# Patient Record
Sex: FEMALE | URBAN - METROPOLITAN AREA
[De-identification: names, ages, dates, MRNs, and addresses within clinical notes are randomized per-mention and may not be internally consistent; named-entity substitution may affect disease eponyms.]

---

## 2017-04-25 ENCOUNTER — INPATIENT (INPATIENT)
Facility: HOSPITAL | Age: 27
LOS: 2 days | Discharge: HOME | End: 2017-04-28
Attending: OBSTETRICS & GYNECOLOGY | Admitting: OBSTETRICS & GYNECOLOGY

## 2017-04-25 VITALS — WEIGHT: 264.55 LBS | HEIGHT: 70 IN

## 2017-04-25 DIAGNOSIS — Z3A.00 WEEKS OF GESTATION OF PREGNANCY NOT SPECIFIED: ICD-10-CM

## 2017-04-25 DIAGNOSIS — O26.899 OTHER SPECIFIED PREGNANCY RELATED CONDITIONS, UNSPECIFIED TRIMESTER: ICD-10-CM

## 2017-04-25 LAB
BLD GP AB SCN SERPL QL: NEGATIVE — SIGNIFICANT CHANGE UP
HCT VFR BLD CALC: 35.9 % — SIGNIFICANT CHANGE UP (ref 34.5–45)
HGB BLD-MCNC: 11.6 G/DL — SIGNIFICANT CHANGE UP (ref 11.5–15.5)
MCHC RBC-ENTMCNC: 29.1 PG — SIGNIFICANT CHANGE UP (ref 27–34)
MCHC RBC-ENTMCNC: 32.3 % — SIGNIFICANT CHANGE UP (ref 32–36)
MCV RBC AUTO: 90 FL — SIGNIFICANT CHANGE UP (ref 80–100)
PLATELET # BLD AUTO: 206 K/UL — SIGNIFICANT CHANGE UP (ref 150–400)
PMV BLD: 10.5 FL — SIGNIFICANT CHANGE UP (ref 7–13)
RBC # BLD: 3.99 M/UL — SIGNIFICANT CHANGE UP (ref 3.8–5.2)
RBC # FLD: 13.8 % — SIGNIFICANT CHANGE UP (ref 10.3–14.5)
RH IG SCN BLD-IMP: POSITIVE — SIGNIFICANT CHANGE UP
RH IG SCN BLD-IMP: POSITIVE — SIGNIFICANT CHANGE UP
WBC # BLD: 13.07 K/UL — HIGH (ref 3.8–10.5)
WBC # FLD AUTO: 13.07 K/UL — HIGH (ref 3.8–10.5)

## 2017-04-25 RX ORDER — SODIUM CHLORIDE 9 MG/ML
1000 INJECTION, SOLUTION INTRAVENOUS ONCE
Refills: 0 | Status: COMPLETED | OUTPATIENT
Start: 2017-04-25 | End: 2017-04-26

## 2017-04-25 RX ORDER — CITRIC ACID/SODIUM CITRATE 300-500 MG
30 SOLUTION, ORAL ORAL ONCE
Refills: 0 | Status: DISCONTINUED | OUTPATIENT
Start: 2017-04-25 | End: 2017-04-27

## 2017-04-25 RX ORDER — CITRIC ACID/SODIUM CITRATE 300-500 MG
15 SOLUTION, ORAL ORAL EVERY 4 HOURS
Refills: 0 | Status: DISCONTINUED | OUTPATIENT
Start: 2017-04-25 | End: 2017-04-26

## 2017-04-25 RX ORDER — INFLUENZA VIRUS VACCINE 15; 15; 15; 15 UG/.5ML; UG/.5ML; UG/.5ML; UG/.5ML
0.5 SUSPENSION INTRAMUSCULAR ONCE
Refills: 0 | Status: DISCONTINUED | OUTPATIENT
Start: 2017-04-25 | End: 2017-04-28

## 2017-04-25 RX ORDER — OXYTOCIN 10 UNIT/ML
333.33 VIAL (ML) INJECTION
Qty: 20 | Refills: 0 | Status: DISCONTINUED | OUTPATIENT
Start: 2017-04-25 | End: 2017-04-26

## 2017-04-25 RX ORDER — SODIUM CHLORIDE 9 MG/ML
1000 INJECTION, SOLUTION INTRAVENOUS
Refills: 0 | Status: DISCONTINUED | OUTPATIENT
Start: 2017-04-25 | End: 2017-04-27

## 2017-04-25 RX ORDER — SODIUM CHLORIDE 9 MG/ML
1000 INJECTION, SOLUTION INTRAVENOUS
Refills: 0 | Status: DISCONTINUED | OUTPATIENT
Start: 2017-04-25 | End: 2017-04-26

## 2017-04-25 RX ORDER — METOCLOPRAMIDE HCL 10 MG
10 TABLET ORAL ONCE
Refills: 0 | Status: DISCONTINUED | OUTPATIENT
Start: 2017-04-25 | End: 2017-04-27

## 2017-04-25 RX ORDER — FAMOTIDINE 10 MG/ML
20 INJECTION INTRAVENOUS ONCE
Refills: 0 | Status: DISCONTINUED | OUTPATIENT
Start: 2017-04-25 | End: 2017-04-27

## 2017-04-25 RX ORDER — SODIUM CHLORIDE 9 MG/ML
500 INJECTION, SOLUTION INTRAVENOUS ONCE
Refills: 0 | Status: COMPLETED | OUTPATIENT
Start: 2017-04-25 | End: 2017-04-25

## 2017-04-25 RX ADMIN — SODIUM CHLORIDE 1000 MILLILITER(S): 9 INJECTION, SOLUTION INTRAVENOUS at 19:41

## 2017-04-25 RX ADMIN — SODIUM CHLORIDE 125 MILLILITER(S): 9 INJECTION, SOLUTION INTRAVENOUS at 23:12

## 2017-04-26 ENCOUNTER — TRANSCRIPTION ENCOUNTER (OUTPATIENT)
Age: 27
End: 2017-04-26

## 2017-04-26 LAB — T PALLIDUM AB TITR SER: NEGATIVE — SIGNIFICANT CHANGE UP

## 2017-04-26 RX ORDER — OXYTOCIN 10 UNIT/ML
333.33 VIAL (ML) INJECTION
Qty: 20 | Refills: 0 | Status: COMPLETED | OUTPATIENT
Start: 2017-04-26

## 2017-04-26 RX ORDER — OXYTOCIN 10 UNIT/ML
41.67 VIAL (ML) INJECTION
Qty: 20 | Refills: 0 | Status: DISCONTINUED | OUTPATIENT
Start: 2017-04-26 | End: 2017-04-27

## 2017-04-26 RX ORDER — OXYCODONE HYDROCHLORIDE 5 MG/1
5 TABLET ORAL
Refills: 0 | Status: DISCONTINUED | OUTPATIENT
Start: 2017-04-26 | End: 2017-04-28

## 2017-04-26 RX ORDER — TETANUS TOXOID, REDUCED DIPHTHERIA TOXOID AND ACELLULAR PERTUSSIS VACCINE, ADSORBED 5; 2.5; 8; 8; 2.5 [IU]/.5ML; [IU]/.5ML; UG/.5ML; UG/.5ML; UG/.5ML
0.5 SUSPENSION INTRAMUSCULAR ONCE
Refills: 0 | Status: DISCONTINUED | OUTPATIENT
Start: 2017-04-27 | End: 2017-04-28

## 2017-04-26 RX ORDER — OXYTOCIN 10 UNIT/ML
333.33 VIAL (ML) INJECTION
Qty: 20 | Refills: 0 | Status: COMPLETED | OUTPATIENT
Start: 2017-04-26 | End: 2017-04-26

## 2017-04-26 RX ORDER — LANOLIN
1 OINTMENT (GRAM) TOPICAL EVERY 6 HOURS
Refills: 0 | Status: DISCONTINUED | OUTPATIENT
Start: 2017-04-27 | End: 2017-04-28

## 2017-04-26 RX ORDER — IBUPROFEN 200 MG
600 TABLET ORAL EVERY 6 HOURS
Refills: 0 | Status: DISCONTINUED | OUTPATIENT
Start: 2017-04-26 | End: 2017-04-28

## 2017-04-26 RX ORDER — ACETAMINOPHEN 500 MG
975 TABLET ORAL EVERY 6 HOURS
Refills: 0 | Status: DISCONTINUED | OUTPATIENT
Start: 2017-04-26 | End: 2017-04-28

## 2017-04-26 RX ORDER — KETOROLAC TROMETHAMINE 30 MG/ML
30 SYRINGE (ML) INJECTION ONCE
Refills: 0 | Status: DISCONTINUED | OUTPATIENT
Start: 2017-04-26 | End: 2017-04-26

## 2017-04-26 RX ORDER — HYDROCORTISONE 1 %
1 OINTMENT (GRAM) TOPICAL EVERY 4 HOURS
Refills: 0 | Status: DISCONTINUED | OUTPATIENT
Start: 2017-04-26 | End: 2017-04-26

## 2017-04-26 RX ORDER — BUTORPHANOL TARTRATE 2 MG/ML
2 INJECTION, SOLUTION INTRAMUSCULAR; INTRAVENOUS ONCE
Refills: 0 | Status: DISCONTINUED | OUTPATIENT
Start: 2017-04-26 | End: 2017-04-26

## 2017-04-26 RX ORDER — DIBUCAINE 1 %
1 OINTMENT (GRAM) RECTAL EVERY 4 HOURS
Refills: 0 | Status: DISCONTINUED | OUTPATIENT
Start: 2017-04-26 | End: 2017-04-26

## 2017-04-26 RX ORDER — DOCUSATE SODIUM 100 MG
100 CAPSULE ORAL
Refills: 0 | Status: DISCONTINUED | OUTPATIENT
Start: 2017-04-27 | End: 2017-04-28

## 2017-04-26 RX ORDER — OXYTOCIN 10 UNIT/ML
2 VIAL (ML) INJECTION
Qty: 30 | Refills: 0 | Status: DISCONTINUED | OUTPATIENT
Start: 2017-04-26 | End: 2017-04-27

## 2017-04-26 RX ORDER — SODIUM CHLORIDE 9 MG/ML
3 INJECTION INTRAMUSCULAR; INTRAVENOUS; SUBCUTANEOUS EVERY 8 HOURS
Refills: 0 | Status: DISCONTINUED | OUTPATIENT
Start: 2017-04-27 | End: 2017-04-27

## 2017-04-26 RX ORDER — SODIUM CHLORIDE 9 MG/ML
3 INJECTION INTRAMUSCULAR; INTRAVENOUS; SUBCUTANEOUS EVERY 8 HOURS
Refills: 0 | Status: DISCONTINUED | OUTPATIENT
Start: 2017-04-26 | End: 2017-04-26

## 2017-04-26 RX ORDER — PRAMOXINE HYDROCHLORIDE 150 MG/15G
1 AEROSOL, FOAM RECTAL EVERY 4 HOURS
Refills: 0 | Status: DISCONTINUED | OUTPATIENT
Start: 2017-04-27 | End: 2017-04-27

## 2017-04-26 RX ORDER — SIMETHICONE 80 MG/1
80 TABLET, CHEWABLE ORAL EVERY 6 HOURS
Refills: 0 | Status: DISCONTINUED | OUTPATIENT
Start: 2017-04-27 | End: 2017-04-28

## 2017-04-26 RX ORDER — MAGNESIUM HYDROXIDE 400 MG/1
30 TABLET, CHEWABLE ORAL
Refills: 0 | Status: DISCONTINUED | OUTPATIENT
Start: 2017-04-27 | End: 2017-04-28

## 2017-04-26 RX ORDER — DIPHENHYDRAMINE HCL 50 MG
25 CAPSULE ORAL EVERY 6 HOURS
Refills: 0 | Status: DISCONTINUED | OUTPATIENT
Start: 2017-04-27 | End: 2017-04-28

## 2017-04-26 RX ORDER — AER TRAVELER 0.5 G/1
1 SOLUTION RECTAL; TOPICAL EVERY 4 HOURS
Refills: 0 | Status: DISCONTINUED | OUTPATIENT
Start: 2017-04-27 | End: 2017-04-28

## 2017-04-26 RX ORDER — AER TRAVELER 0.5 G/1
1 SOLUTION RECTAL; TOPICAL EVERY 4 HOURS
Refills: 0 | Status: DISCONTINUED | OUTPATIENT
Start: 2017-04-26 | End: 2017-04-26

## 2017-04-26 RX ORDER — OXYTOCIN 10 UNIT/ML
41.67 VIAL (ML) INJECTION
Qty: 20 | Refills: 0 | Status: DISCONTINUED | OUTPATIENT
Start: 2017-04-27 | End: 2017-04-27

## 2017-04-26 RX ORDER — DIBUCAINE 1 %
1 OINTMENT (GRAM) RECTAL EVERY 4 HOURS
Refills: 0 | Status: DISCONTINUED | OUTPATIENT
Start: 2017-04-27 | End: 2017-04-28

## 2017-04-26 RX ORDER — HYDROCORTISONE 1 %
1 OINTMENT (GRAM) TOPICAL EVERY 4 HOURS
Refills: 0 | Status: DISCONTINUED | OUTPATIENT
Start: 2017-04-27 | End: 2017-04-27

## 2017-04-26 RX ORDER — OXYCODONE HYDROCHLORIDE 5 MG/1
5 TABLET ORAL EVERY 4 HOURS
Refills: 0 | Status: DISCONTINUED | OUTPATIENT
Start: 2017-04-26 | End: 2017-04-28

## 2017-04-26 RX ORDER — PRAMOXINE HYDROCHLORIDE 150 MG/15G
1 AEROSOL, FOAM RECTAL EVERY 4 HOURS
Refills: 0 | Status: DISCONTINUED | OUTPATIENT
Start: 2017-04-26 | End: 2017-04-26

## 2017-04-26 RX ORDER — GLYCERIN ADULT
1 SUPPOSITORY, RECTAL RECTAL AT BEDTIME
Refills: 0 | Status: DISCONTINUED | OUTPATIENT
Start: 2017-04-27 | End: 2017-04-28

## 2017-04-26 RX ADMIN — BUTORPHANOL TARTRATE 2 MILLIGRAM(S): 2 INJECTION, SOLUTION INTRAMUSCULAR; INTRAVENOUS at 05:28

## 2017-04-26 RX ADMIN — BUTORPHANOL TARTRATE 2 MILLIGRAM(S): 2 INJECTION, SOLUTION INTRAMUSCULAR; INTRAVENOUS at 05:48

## 2017-04-26 RX ADMIN — Medication 30 MILLIGRAM(S): at 18:05

## 2017-04-26 RX ADMIN — BUTORPHANOL TARTRATE 2 MILLIGRAM(S): 2 INJECTION, SOLUTION INTRAMUSCULAR; INTRAVENOUS at 11:45

## 2017-04-26 RX ADMIN — BUTORPHANOL TARTRATE 2 MILLIGRAM(S): 2 INJECTION, SOLUTION INTRAMUSCULAR; INTRAVENOUS at 11:10

## 2017-04-26 RX ADMIN — Medication 1000 MILLIUNIT(S)/MIN: at 19:22

## 2017-04-26 RX ADMIN — SODIUM CHLORIDE 2000 MILLILITER(S): 9 INJECTION, SOLUTION INTRAVENOUS at 13:00

## 2017-04-26 RX ADMIN — Medication 204 MILLIGRAM(S): at 11:10

## 2017-04-26 RX ADMIN — Medication 204 MILLIGRAM(S): at 05:28

## 2017-04-26 RX ADMIN — Medication 30 MILLIGRAM(S): at 19:28

## 2017-04-26 RX ADMIN — Medication 2 MILLIUNIT(S)/MIN: at 15:57

## 2017-04-26 RX ADMIN — Medication 125 MILLIUNIT(S)/MIN: at 19:05

## 2017-04-26 NOTE — DISCHARGE NOTE OB - MATERIALS PROVIDED
[facility] Screening Program/  Immunization Record/Breastfeeding Log/Breastfeeding Mother’s Support Group Information/[facility] Hearing Screen Program/Back To Sleep Handout/Shaken Baby Prevention Handout/Breastfeeding Guide and Packet/Birth Certificate Instructions

## 2017-04-26 NOTE — DISCHARGE NOTE OB - PATIENT PORTAL LINK FT
“You can access the FollowHealth Patient Portal, offered by [facility], by registering with the following website: [email or web address]”

## 2017-04-26 NOTE — DISCHARGE NOTE OB - CARE PROVIDER_API CALL
Elliot Moore), Obstetrics and Gynecology  [address] [place] [address]  Phone: [phone]  Fax: [phone]

## 2017-04-26 NOTE — DISCHARGE NOTE OB - CARE PLAN
Principal Discharge DX:	40 weeks gestation of pregnancy  Goal:	Safe delivery of patient  Instructions for follow-up, activity and diet:	Nothing in the vagina, no tampons, douches, baths, swimming or sex for 6-8 weeks.  Regular diet, follow up visit in 6 weeks  Secondary Diagnosis:	Oligohydramnios, third trimester, not applicable or unspecified fetus

## 2017-04-27 RX ORDER — PRAMOXINE HYDROCHLORIDE 150 MG/15G
1 AEROSOL, FOAM RECTAL EVERY 4 HOURS
Refills: 0 | Status: DISCONTINUED | OUTPATIENT
Start: 2017-04-27 | End: 2017-04-28

## 2017-04-27 RX ORDER — HYDROCORTISONE 1 %
1 OINTMENT (GRAM) TOPICAL EVERY 4 HOURS
Refills: 0 | Status: DISCONTINUED | OUTPATIENT
Start: 2017-04-27 | End: 2017-04-28

## 2017-04-27 RX ADMIN — Medication 1 TABLET(S): at 10:27

## 2017-04-27 RX ADMIN — Medication 975 MILLIGRAM(S): at 14:00

## 2017-04-27 RX ADMIN — Medication 600 MILLIGRAM(S): at 11:00

## 2017-04-27 RX ADMIN — Medication 975 MILLIGRAM(S): at 23:00

## 2017-04-27 RX ADMIN — Medication 975 MILLIGRAM(S): at 22:00

## 2017-04-27 RX ADMIN — Medication 600 MILLIGRAM(S): at 18:30

## 2017-04-27 RX ADMIN — Medication 600 MILLIGRAM(S): at 10:27

## 2017-04-27 RX ADMIN — Medication 1 APPLICATION(S): at 22:00

## 2017-04-27 RX ADMIN — Medication 600 MILLIGRAM(S): at 17:46

## 2017-04-27 RX ADMIN — Medication 975 MILLIGRAM(S): at 13:15

## 2017-04-28 VITALS
RESPIRATION RATE: 18 BRPM | SYSTOLIC BLOOD PRESSURE: 128 MMHG | OXYGEN SATURATION: 99 % | HEART RATE: 57 BPM | TEMPERATURE: 99 F | DIASTOLIC BLOOD PRESSURE: 76 MMHG

## 2017-04-28 RX ADMIN — Medication 600 MILLIGRAM(S): at 04:00

## 2017-04-28 RX ADMIN — Medication 600 MILLIGRAM(S): at 12:30

## 2017-04-28 RX ADMIN — Medication 600 MILLIGRAM(S): at 03:00

## 2017-04-28 RX ADMIN — Medication 600 MILLIGRAM(S): at 11:47

## 2017-04-28 RX ADMIN — Medication 1 TABLET(S): at 16:02

## 2017-04-28 RX ADMIN — Medication 975 MILLIGRAM(S): at 07:30

## 2017-04-28 RX ADMIN — Medication 975 MILLIGRAM(S): at 06:30

## 2019-03-12 ENCOUNTER — ASOB RESULT (OUTPATIENT)
Age: 29
End: 2019-03-12

## 2019-03-12 ENCOUNTER — APPOINTMENT (OUTPATIENT)
Dept: ANTEPARTUM | Facility: CLINIC | Age: 29
End: 2019-03-12
Payer: COMMERCIAL

## 2019-03-12 PROCEDURE — 36416 COLLJ CAPILLARY BLOOD SPEC: CPT

## 2019-03-12 PROCEDURE — 76801 OB US < 14 WKS SINGLE FETUS: CPT

## 2019-03-12 PROCEDURE — 76813 OB US NUCHAL MEAS 1 GEST: CPT

## 2019-05-06 ENCOUNTER — ASOB RESULT (OUTPATIENT)
Age: 29
End: 2019-05-06

## 2019-05-06 ENCOUNTER — APPOINTMENT (OUTPATIENT)
Dept: ANTEPARTUM | Facility: CLINIC | Age: 29
End: 2019-05-06
Payer: COMMERCIAL

## 2019-05-06 PROCEDURE — 76811 OB US DETAILED SNGL FETUS: CPT

## 2019-05-06 PROCEDURE — 76817 TRANSVAGINAL US OBSTETRIC: CPT

## 2019-05-13 ENCOUNTER — ASOB RESULT (OUTPATIENT)
Age: 29
End: 2019-05-13

## 2019-05-13 ENCOUNTER — APPOINTMENT (OUTPATIENT)
Dept: ANTEPARTUM | Facility: CLINIC | Age: 29
End: 2019-05-13
Payer: COMMERCIAL

## 2019-05-13 PROCEDURE — 76816 OB US FOLLOW-UP PER FETUS: CPT

## 2019-08-12 ENCOUNTER — ASOB RESULT (OUTPATIENT)
Age: 29
End: 2019-08-12

## 2019-08-12 ENCOUNTER — APPOINTMENT (OUTPATIENT)
Dept: ANTEPARTUM | Facility: CLINIC | Age: 29
End: 2019-08-12
Payer: COMMERCIAL

## 2019-08-12 PROCEDURE — 76816 OB US FOLLOW-UP PER FETUS: CPT

## 2019-09-04 ENCOUNTER — ASOB RESULT (OUTPATIENT)
Age: 29
End: 2019-09-04

## 2019-09-04 ENCOUNTER — APPOINTMENT (OUTPATIENT)
Dept: ANTEPARTUM | Facility: CLINIC | Age: 29
End: 2019-09-04
Payer: COMMERCIAL

## 2019-09-04 PROCEDURE — 76819 FETAL BIOPHYS PROFIL W/O NST: CPT

## 2019-09-04 PROCEDURE — 76820 UMBILICAL ARTERY ECHO: CPT

## 2019-09-04 PROCEDURE — 76816 OB US FOLLOW-UP PER FETUS: CPT

## 2019-09-09 ENCOUNTER — APPOINTMENT (OUTPATIENT)
Dept: ANTEPARTUM | Facility: CLINIC | Age: 29
End: 2019-09-09
Payer: COMMERCIAL

## 2019-09-09 ENCOUNTER — ASOB RESULT (OUTPATIENT)
Age: 29
End: 2019-09-09

## 2019-09-09 PROCEDURE — 76819 FETAL BIOPHYS PROFIL W/O NST: CPT

## 2019-09-18 ENCOUNTER — OUTPATIENT (OUTPATIENT)
Dept: OUTPATIENT SERVICES | Facility: HOSPITAL | Age: 29
LOS: 1 days | End: 2019-09-18
Payer: COMMERCIAL

## 2019-09-18 ENCOUNTER — APPOINTMENT (OUTPATIENT)
Dept: ANTEPARTUM | Facility: CLINIC | Age: 29
End: 2019-09-18

## 2019-09-18 ENCOUNTER — ASOB RESULT (OUTPATIENT)
Age: 29
End: 2019-09-18

## 2019-09-18 DIAGNOSIS — Z3A.00 WEEKS OF GESTATION OF PREGNANCY NOT SPECIFIED: ICD-10-CM

## 2019-09-18 DIAGNOSIS — O26.899 OTHER SPECIFIED PREGNANCY RELATED CONDITIONS, UNSPECIFIED TRIMESTER: ICD-10-CM

## 2019-09-18 PROCEDURE — G0463: CPT

## 2019-09-18 PROCEDURE — 59025 FETAL NON-STRESS TEST: CPT

## 2019-09-18 PROCEDURE — 76818 FETAL BIOPHYS PROFILE W/NST: CPT

## 2019-09-18 PROCEDURE — 76818 FETAL BIOPHYS PROFILE W/NST: CPT | Mod: 26

## 2019-09-18 PROCEDURE — 76816 OB US FOLLOW-UP PER FETUS: CPT | Mod: 26

## 2019-09-18 PROCEDURE — 76816 OB US FOLLOW-UP PER FETUS: CPT

## 2019-09-22 ENCOUNTER — INPATIENT (INPATIENT)
Facility: HOSPITAL | Age: 29
LOS: 1 days | Discharge: HOME | End: 2019-09-24
Attending: OBSTETRICS & GYNECOLOGY | Admitting: OBSTETRICS & GYNECOLOGY
Payer: COMMERCIAL

## 2019-09-22 VITALS — HEIGHT: 70 IN | WEIGHT: 257.94 LBS

## 2019-09-22 DIAGNOSIS — Z3A.00 WEEKS OF GESTATION OF PREGNANCY NOT SPECIFIED: ICD-10-CM

## 2019-09-22 DIAGNOSIS — O26.899 OTHER SPECIFIED PREGNANCY RELATED CONDITIONS, UNSPECIFIED TRIMESTER: ICD-10-CM

## 2019-09-22 DIAGNOSIS — Z34.80 ENCOUNTER FOR SUPERVISION OF OTHER NORMAL PREGNANCY, UNSPECIFIED TRIMESTER: ICD-10-CM

## 2019-09-22 LAB
ALBUMIN SERPL ELPH-MCNC: 3.4 G/DL — SIGNIFICANT CHANGE UP (ref 3.3–5)
ALP SERPL-CCNC: 99 U/L — SIGNIFICANT CHANGE UP (ref 40–120)
ALT FLD-CCNC: 8 U/L — LOW (ref 10–45)
ANION GAP SERPL CALC-SCNC: 16 MMOL/L — SIGNIFICANT CHANGE UP (ref 5–17)
APPEARANCE UR: CLEAR — SIGNIFICANT CHANGE UP
APTT BLD: 25.8 SEC — LOW (ref 27.5–36.3)
AST SERPL-CCNC: 10 U/L — SIGNIFICANT CHANGE UP (ref 10–40)
BASOPHILS # BLD AUTO: 0.1 K/UL — SIGNIFICANT CHANGE UP (ref 0–0.2)
BASOPHILS NFR BLD AUTO: 0.4 % — SIGNIFICANT CHANGE UP (ref 0–2)
BILIRUB SERPL-MCNC: 0.2 MG/DL — SIGNIFICANT CHANGE UP (ref 0.2–1.2)
BILIRUB UR-MCNC: NEGATIVE — SIGNIFICANT CHANGE UP
BLD GP AB SCN SERPL QL: NEGATIVE — SIGNIFICANT CHANGE UP
BUN SERPL-MCNC: 10 MG/DL — SIGNIFICANT CHANGE UP (ref 7–23)
CALCIUM SERPL-MCNC: 9.7 MG/DL — SIGNIFICANT CHANGE UP (ref 8.4–10.5)
CHLORIDE SERPL-SCNC: 102 MMOL/L — SIGNIFICANT CHANGE UP (ref 96–108)
CO2 SERPL-SCNC: 17 MMOL/L — LOW (ref 22–31)
COLOR SPEC: SIGNIFICANT CHANGE UP
CREAT ?TM UR-MCNC: 92 MG/DL — SIGNIFICANT CHANGE UP
CREAT SERPL-MCNC: 0.62 MG/DL — SIGNIFICANT CHANGE UP (ref 0.5–1.3)
DIFF PNL FLD: NEGATIVE — SIGNIFICANT CHANGE UP
EOSINOPHIL # BLD AUTO: 0.2 K/UL — SIGNIFICANT CHANGE UP (ref 0–0.5)
EOSINOPHIL NFR BLD AUTO: 0.9 % — SIGNIFICANT CHANGE UP (ref 0–6)
FIBRINOGEN PPP-MCNC: 642 MG/DL — HIGH (ref 350–510)
GLUCOSE SERPL-MCNC: 89 MG/DL — SIGNIFICANT CHANGE UP (ref 70–99)
GLUCOSE UR QL: NEGATIVE — SIGNIFICANT CHANGE UP
HCT VFR BLD CALC: 35.9 % — SIGNIFICANT CHANGE UP (ref 34.5–45)
HGB BLD-MCNC: 12.1 G/DL — SIGNIFICANT CHANGE UP (ref 11.5–15.5)
INR BLD: 0.93 RATIO — SIGNIFICANT CHANGE UP (ref 0.88–1.16)
KETONES UR-MCNC: ABNORMAL
LDH SERPL L TO P-CCNC: 163 U/L — SIGNIFICANT CHANGE UP (ref 50–242)
LEUKOCYTE ESTERASE UR-ACNC: NEGATIVE — SIGNIFICANT CHANGE UP
LYMPHOCYTES # BLD AUTO: 17.7 % — SIGNIFICANT CHANGE UP (ref 13–44)
LYMPHOCYTES # BLD AUTO: 3 K/UL — SIGNIFICANT CHANGE UP (ref 1–3.3)
MCHC RBC-ENTMCNC: 30.4 PG — SIGNIFICANT CHANGE UP (ref 27–34)
MCHC RBC-ENTMCNC: 33.7 GM/DL — SIGNIFICANT CHANGE UP (ref 32–36)
MCV RBC AUTO: 90.1 FL — SIGNIFICANT CHANGE UP (ref 80–100)
MONOCYTES # BLD AUTO: 1.3 K/UL — HIGH (ref 0–0.9)
MONOCYTES NFR BLD AUTO: 7.6 % — SIGNIFICANT CHANGE UP (ref 2–14)
NEUTROPHILS # BLD AUTO: 12.6 K/UL — HIGH (ref 1.8–7.4)
NEUTROPHILS NFR BLD AUTO: 73.5 % — SIGNIFICANT CHANGE UP (ref 43–77)
NITRITE UR-MCNC: NEGATIVE — SIGNIFICANT CHANGE UP
PH UR: 6 — SIGNIFICANT CHANGE UP (ref 5–8)
PLATELET # BLD AUTO: 208 K/UL — SIGNIFICANT CHANGE UP (ref 150–400)
POTASSIUM SERPL-MCNC: 3.9 MMOL/L — SIGNIFICANT CHANGE UP (ref 3.5–5.3)
POTASSIUM SERPL-SCNC: 3.9 MMOL/L — SIGNIFICANT CHANGE UP (ref 3.5–5.3)
PROT ?TM UR-MCNC: 12 MG/DL — SIGNIFICANT CHANGE UP (ref 0–12)
PROT SERPL-MCNC: 6.4 G/DL — SIGNIFICANT CHANGE UP (ref 6–8.3)
PROT UR-MCNC: SIGNIFICANT CHANGE UP
PROT/CREAT UR-RTO: 0.1 RATIO — SIGNIFICANT CHANGE UP (ref 0–0.2)
PROTHROM AB SERPL-ACNC: 10.7 SEC — SIGNIFICANT CHANGE UP (ref 10–12.9)
RBC # BLD: 3.98 M/UL — SIGNIFICANT CHANGE UP (ref 3.8–5.2)
RBC # FLD: 12.6 % — SIGNIFICANT CHANGE UP (ref 10.3–14.5)
RH IG SCN BLD-IMP: POSITIVE — SIGNIFICANT CHANGE UP
RH IG SCN BLD-IMP: POSITIVE — SIGNIFICANT CHANGE UP
SODIUM SERPL-SCNC: 135 MMOL/L — SIGNIFICANT CHANGE UP (ref 135–145)
SP GR SPEC: 1.02 — SIGNIFICANT CHANGE UP (ref 1.01–1.02)
URATE SERPL-MCNC: 4.3 MG/DL — SIGNIFICANT CHANGE UP (ref 2.5–7)
UROBILINOGEN FLD QL: NEGATIVE — SIGNIFICANT CHANGE UP
WBC # BLD: 17.1 K/UL — HIGH (ref 3.8–10.5)
WBC # FLD AUTO: 17.1 K/UL — HIGH (ref 3.8–10.5)

## 2019-09-22 RX ORDER — GLYCERIN ADULT
1 SUPPOSITORY, RECTAL RECTAL AT BEDTIME
Refills: 0 | Status: DISCONTINUED | OUTPATIENT
Start: 2019-09-22 | End: 2019-09-24

## 2019-09-22 RX ORDER — OXYCODONE HYDROCHLORIDE 5 MG/1
5 TABLET ORAL
Refills: 0 | Status: DISCONTINUED | OUTPATIENT
Start: 2019-09-22 | End: 2019-09-24

## 2019-09-22 RX ORDER — ACETAMINOPHEN 500 MG
975 TABLET ORAL
Refills: 0 | Status: DISCONTINUED | OUTPATIENT
Start: 2019-09-22 | End: 2019-09-24

## 2019-09-22 RX ORDER — TETANUS TOXOID, REDUCED DIPHTHERIA TOXOID AND ACELLULAR PERTUSSIS VACCINE, ADSORBED 5; 2.5; 8; 8; 2.5 [IU]/.5ML; [IU]/.5ML; UG/.5ML; UG/.5ML; UG/.5ML
0.5 SUSPENSION INTRAMUSCULAR ONCE
Refills: 0 | Status: DISCONTINUED | OUTPATIENT
Start: 2019-09-22 | End: 2019-09-24

## 2019-09-22 RX ORDER — IBUPROFEN 200 MG
600 TABLET ORAL EVERY 6 HOURS
Refills: 0 | Status: COMPLETED | OUTPATIENT
Start: 2019-09-22 | End: 2020-08-20

## 2019-09-22 RX ORDER — SODIUM CHLORIDE 9 MG/ML
1000 INJECTION, SOLUTION INTRAVENOUS
Refills: 0 | Status: DISCONTINUED | OUTPATIENT
Start: 2019-09-22 | End: 2019-09-22

## 2019-09-22 RX ORDER — DOCUSATE SODIUM 100 MG
100 CAPSULE ORAL
Refills: 0 | Status: DISCONTINUED | OUTPATIENT
Start: 2019-09-22 | End: 2019-09-24

## 2019-09-22 RX ORDER — AER TRAVELER 0.5 G/1
1 SOLUTION RECTAL; TOPICAL EVERY 4 HOURS
Refills: 0 | Status: DISCONTINUED | OUTPATIENT
Start: 2019-09-22 | End: 2019-09-24

## 2019-09-22 RX ORDER — PRAMOXINE HYDROCHLORIDE 150 MG/15G
1 AEROSOL, FOAM RECTAL EVERY 4 HOURS
Refills: 0 | Status: DISCONTINUED | OUTPATIENT
Start: 2019-09-22 | End: 2019-09-24

## 2019-09-22 RX ORDER — SODIUM CHLORIDE 9 MG/ML
3 INJECTION INTRAMUSCULAR; INTRAVENOUS; SUBCUTANEOUS EVERY 8 HOURS
Refills: 0 | Status: DISCONTINUED | OUTPATIENT
Start: 2019-09-22 | End: 2019-09-24

## 2019-09-22 RX ORDER — OXYTOCIN 10 UNIT/ML
333.33 VIAL (ML) INJECTION
Qty: 20 | Refills: 0 | Status: DISCONTINUED | OUTPATIENT
Start: 2019-09-22 | End: 2019-09-24

## 2019-09-22 RX ORDER — DIPHENHYDRAMINE HCL 50 MG
25 CAPSULE ORAL EVERY 6 HOURS
Refills: 0 | Status: DISCONTINUED | OUTPATIENT
Start: 2019-09-22 | End: 2019-09-24

## 2019-09-22 RX ORDER — DIBUCAINE 1 %
1 OINTMENT (GRAM) RECTAL EVERY 6 HOURS
Refills: 0 | Status: DISCONTINUED | OUTPATIENT
Start: 2019-09-22 | End: 2019-09-24

## 2019-09-22 RX ORDER — HYDROCORTISONE 1 %
1 OINTMENT (GRAM) TOPICAL EVERY 6 HOURS
Refills: 0 | Status: DISCONTINUED | OUTPATIENT
Start: 2019-09-22 | End: 2019-09-24

## 2019-09-22 RX ORDER — SIMETHICONE 80 MG/1
80 TABLET, CHEWABLE ORAL EVERY 4 HOURS
Refills: 0 | Status: DISCONTINUED | OUTPATIENT
Start: 2019-09-22 | End: 2019-09-24

## 2019-09-22 RX ORDER — KETOROLAC TROMETHAMINE 30 MG/ML
30 SYRINGE (ML) INJECTION ONCE
Refills: 0 | Status: DISCONTINUED | OUTPATIENT
Start: 2019-09-22 | End: 2019-09-22

## 2019-09-22 RX ORDER — LANOLIN
1 OINTMENT (GRAM) TOPICAL EVERY 6 HOURS
Refills: 0 | Status: DISCONTINUED | OUTPATIENT
Start: 2019-09-22 | End: 2019-09-24

## 2019-09-22 RX ORDER — OXYCODONE HYDROCHLORIDE 5 MG/1
5 TABLET ORAL ONCE
Refills: 0 | Status: DISCONTINUED | OUTPATIENT
Start: 2019-09-22 | End: 2019-09-24

## 2019-09-22 RX ORDER — CITRIC ACID/SODIUM CITRATE 300-500 MG
15 SOLUTION, ORAL ORAL EVERY 6 HOURS
Refills: 0 | Status: DISCONTINUED | OUTPATIENT
Start: 2019-09-22 | End: 2019-09-22

## 2019-09-22 RX ORDER — MAGNESIUM HYDROXIDE 400 MG/1
30 TABLET, CHEWABLE ORAL
Refills: 0 | Status: DISCONTINUED | OUTPATIENT
Start: 2019-09-22 | End: 2019-09-24

## 2019-09-22 RX ORDER — OXYTOCIN 10 UNIT/ML
333.33 VIAL (ML) INJECTION
Qty: 20 | Refills: 0 | Status: DISCONTINUED | OUTPATIENT
Start: 2019-09-22 | End: 2019-09-22

## 2019-09-22 RX ORDER — BENZOCAINE 10 %
1 GEL (GRAM) MUCOUS MEMBRANE EVERY 6 HOURS
Refills: 0 | Status: DISCONTINUED | OUTPATIENT
Start: 2019-09-22 | End: 2019-09-24

## 2019-09-22 RX ADMIN — Medication 1000 MILLIUNIT(S)/MIN: at 23:16

## 2019-09-22 RX ADMIN — SODIUM CHLORIDE 125 MILLILITER(S): 9 INJECTION, SOLUTION INTRAVENOUS at 20:43

## 2019-09-22 RX ADMIN — Medication 30 MILLIGRAM(S): at 23:08

## 2019-09-23 ENCOUNTER — TRANSCRIPTION ENCOUNTER (OUTPATIENT)
Age: 29
End: 2019-09-23

## 2019-09-23 ENCOUNTER — APPOINTMENT (OUTPATIENT)
Dept: ANTEPARTUM | Facility: CLINIC | Age: 29
End: 2019-09-23

## 2019-09-23 LAB
HCT VFR BLD CALC: 34.6 % — SIGNIFICANT CHANGE UP (ref 34.5–45)
HGB BLD-MCNC: 11.4 G/DL — LOW (ref 11.5–15.5)
T PALLIDUM AB TITR SER: NEGATIVE — SIGNIFICANT CHANGE UP

## 2019-09-23 RX ORDER — IBUPROFEN 200 MG
600 TABLET ORAL EVERY 6 HOURS
Refills: 0 | Status: DISCONTINUED | OUTPATIENT
Start: 2019-09-23 | End: 2019-09-24

## 2019-09-23 RX ORDER — LANOLIN
1 OINTMENT (GRAM) TOPICAL
Qty: 0 | Refills: 0 | DISCHARGE
Start: 2019-09-23

## 2019-09-23 RX ORDER — ONDANSETRON 8 MG/1
4 TABLET, FILM COATED ORAL ONCE
Refills: 0 | Status: DISCONTINUED | OUTPATIENT
Start: 2019-09-23 | End: 2019-09-24

## 2019-09-23 RX ORDER — ACETAMINOPHEN 500 MG
3 TABLET ORAL
Qty: 0 | Refills: 0 | DISCHARGE
Start: 2019-09-23

## 2019-09-23 RX ORDER — DIBUCAINE 1 %
1 OINTMENT (GRAM) RECTAL
Qty: 0 | Refills: 0 | DISCHARGE
Start: 2019-09-23

## 2019-09-23 RX ADMIN — Medication 600 MILLIGRAM(S): at 05:51

## 2019-09-23 RX ADMIN — Medication 975 MILLIGRAM(S): at 03:13

## 2019-09-23 RX ADMIN — Medication 975 MILLIGRAM(S): at 02:43

## 2019-09-23 RX ADMIN — Medication 600 MILLIGRAM(S): at 17:45

## 2019-09-23 RX ADMIN — Medication 600 MILLIGRAM(S): at 17:16

## 2019-09-23 RX ADMIN — Medication 975 MILLIGRAM(S): at 21:10

## 2019-09-23 RX ADMIN — Medication 975 MILLIGRAM(S): at 15:31

## 2019-09-23 RX ADMIN — Medication 600 MILLIGRAM(S): at 12:35

## 2019-09-23 RX ADMIN — SODIUM CHLORIDE 3 MILLILITER(S): 9 INJECTION INTRAMUSCULAR; INTRAVENOUS; SUBCUTANEOUS at 05:53

## 2019-09-23 RX ADMIN — Medication 600 MILLIGRAM(S): at 13:10

## 2019-09-23 RX ADMIN — Medication 975 MILLIGRAM(S): at 10:00

## 2019-09-23 RX ADMIN — Medication 975 MILLIGRAM(S): at 16:00

## 2019-09-23 RX ADMIN — Medication 975 MILLIGRAM(S): at 09:36

## 2019-09-23 RX ADMIN — Medication 1 TABLET(S): at 12:35

## 2019-09-23 RX ADMIN — Medication 975 MILLIGRAM(S): at 20:17

## 2019-09-23 RX ADMIN — Medication 600 MILLIGRAM(S): at 06:21

## 2019-09-23 RX ADMIN — Medication 600 MILLIGRAM(S): at 23:59

## 2019-09-23 NOTE — DISCHARGE NOTE OB - CARE PROVIDER_API CALL
Sea Lozada)  Obstetrics and Gynecology  [address] [facility], [address]  [place] [address]  Phone: [phone]  Fax: [phone]  Follow Up Time:

## 2019-09-23 NOTE — DISCHARGE NOTE OB - CHANGE SANITARY PADS FREQUENTLY.  WASHING AND WIPING SHOULD OCCUR FROM FRONT TO BACK
08/15/19 2200   Treatment Plan   Plan Type Updated Plan   Goals   Patient Reported Goal #1 \"to start medications\"   Pt Reported Goal #1 Type Long Term Goal   Goal #1 Progression Outcome Met - continue evaluating goal progress toward completion   Additional Medical Outcomes Pain   Pain Outcome Acceptable pain/comfort level is achieved/maintained.   Pain Outcome Progression Outcome Met - continue evaluating goal progress toward completion   Focus Indicators   Indicators Anxiety;Paranoia;Irritability   Patient Objectives   Objectives Patient will attend and participate in therapeutic groups   OT/Art Therapy Objectives Not applicable   Therapy Interventions   Interventions Encourage group attendance to learn and reinforce positive coping strategies   OT/Art Therapy Interventions Not applicable   Treatment Plan Agreement   Patient has reviewed and agrees to the above treatment plan Yes   Parent/Guardian has reviewed and agrees to the above treament plan Not applicable     Pt withdrawn, hypersomnolent, and isolative to room for much of shift. Pt irritable and negativistic; when asked about mood, pt replies, \"Oh it's terrible. All the bad stuff right now.\" When asked to elaborate, pt stated that she did not have any current suicidal ideation or CAH, however did still have homicidal ideation toward \"that same person as always\" with no plan or intent to act upon ideation. VSS. Pt approached writer at  and reported anxiety as well as difficulty falling asleep/insomnia. Pt encouraged to utilize existing/learned coping strategies and practice constructive sleep hygiene techniques (focused breathing); PRN hydroxyzine and zolpidem administered (see MAR). Upon one hour reassessment, medication appeared to be effective, as pt was observed to be sleeping. Pt able to make needs known.    Statement Selected

## 2019-09-23 NOTE — PROGRESS NOTE ADULT - SUBJECTIVE AND OBJECTIVE BOX
OB Progress Note:  PPD#1    S: 29yo PPD#1 s/p . Patient feels well. Pain is well controlled. She is tolerating a regular diet, voiding spontaneously, and ambulating without difficulty. Denies CP/SOB. Denies lightheadedness/dizziness. Denies N/V.     O:  Vital Signs Last 24 Hrs  T(C): 36.7 (23 Sep 2019 01:50), Max: 36.7 (23 Sep 2019 01:50)  T(F): 98 (23 Sep 2019 01:50), Max: 98 (23 Sep 2019 01:50)  HR: 76 (23 Sep 2019 01:50) (74 - 96)  BP: 104/67 (23 Sep 2019 01:50) (104/67 - 137/61)  BP(mean): 83 (23 Sep 2019 00:40) (77 - 94)  RR: 18 (23 Sep 2019 01:50) (16 - 18)  SpO2: 98% (23 Sep 2019 00:40) (96% - 99%)    Physical Exam:  General: NAD  Abdomen: soft, non-tender, non-distended, fundus firm  Vaginal: Lochia wnl  Extremities: No erythema/edema    MEDICATIONS  (STANDING):  acetaminophen   Tablet .. 975 milliGRAM(s) Oral <User Schedule>  diphtheria/tetanus/pertussis (acellular) Vaccine (ADAcel) 0.5 milliLiter(s) IntraMuscular once  ibuprofen  Tablet. 600 milliGRAM(s) Oral every 6 hours  oxytocin Infusion 333.333 milliUNIT(s)/Min (1000 mL/Hr) IV Continuous <Continuous>  prenatal multivitamin 1 Tablet(s) Oral daily  sodium chloride 0.9% lock flush 3 milliLiter(s) IV Push every 8 hours      Labs:  Blood type: B Positive  Rubella IgG: RPR:                           12.1   17.1<H> >-----------< 208    (  @ 22:02 )             35.9    19 @ 22:02      135  |  102  |  10  ----------------------------<  89  3.9   |  17<L>  |  0.62        Ca    9.7      22 Sep 2019 22:02    TPro  6.4  /  Alb  3.4  /  TBili  0.2  /  DBili  x   /  AST  10  /  ALT  8<L>  /  AlkPhos  99  19 @ 22:02          A/P: 29yo PPD#1 s/p . Patient is stable and doing well post-partum.   - Pain well controlled, continue current pain regimen  - Increase ambulation as tolerated  - Continue regular diet    Marcella Knight PGY-1  [id number] OB Progress Note:  PPD#1    S: 27yo PPD#1 s/p . Patient feels well. Pain is well controlled. Reports nausea without vomiting, tolerating a regular diet. Voiding spontaneously, and ambulating without difficulty. Denies CP/SOB. Denies lightheadedness/dizziness.     O:  Vital Signs Last 24 Hrs  T(C): 36.7 (23 Sep 2019 01:50), Max: 36.7 (23 Sep 2019 01:50)  T(F): 98 (23 Sep 2019 01:50), Max: 98 (23 Sep 2019 01:50)  HR: 76 (23 Sep 2019 01:50) (74 - 96)  BP: 104/67 (23 Sep 2019 01:50) (104/67 - 137/61)  BP(mean): 83 (23 Sep 2019 00:40) (77 - 94)  RR: 18 (23 Sep 2019 01:50) (16 - 18)  SpO2: 98% (23 Sep 2019 00:40) (96% - 99%)    Physical Exam:  General: NAD  Abdomen: soft, non-tender, non-distended, fundus firm  Vaginal: Lochia wnl  Extremities: No erythema/edema    MEDICATIONS  (STANDING):  acetaminophen   Tablet .. 975 milliGRAM(s) Oral <User Schedule>  diphtheria/tetanus/pertussis (acellular) Vaccine (ADAcel) 0.5 milliLiter(s) IntraMuscular once  ibuprofen  Tablet. 600 milliGRAM(s) Oral every 6 hours  oxytocin Infusion 333.333 milliUNIT(s)/Min (1000 mL/Hr) IV Continuous <Continuous>  prenatal multivitamin 1 Tablet(s) Oral daily  sodium chloride 0.9% lock flush 3 milliLiter(s) IV Push every 8 hours      Labs:  Blood type: B Positive  Rubella IgG: RPR:                           12.1   17.1<H> >-----------< 208    (  @ 22:02 )             35.9    19 @ 22:02      135  |  102  |  10  ----------------------------<  89  3.9   |  17<L>  |  0.62        Ca    9.7      22 Sep 2019 22:02    TPro  6.4  /  Alb  3.4  /  TBili  0.2  /  DBili  x   /  AST  10  /  ALT  8<L>  /  AlkPhos  99  19 @ 22:02

## 2019-09-23 NOTE — DISCHARGE NOTE OB - PATIENT PORTAL LINK FT
You can access the FollowMyHealth Patient Portal offered by [facility] by registering at the following website: [email or web address]. By joining [facility]’s FollowMyHealth portal, you will also be able to view your health information using other applications (apps) compatible with our system.

## 2019-09-23 NOTE — PROGRESS NOTE ADULT - PROBLEM SELECTOR PLAN 1
- Pain well controlled, continue current pain regimen  - Increase ambulation as tolerated  - Continue regular diet  - Zofran x1 for nausea    Marcella Knight PGY-1  [id number]

## 2019-09-23 NOTE — PROVIDER CONTACT NOTE (OTHER) - SITUATION
pt delivered 7'11lb female NB @ 40.2wks states she passed a clot while getting up to go to the bathroom

## 2019-09-23 NOTE — PROGRESS NOTE ADULT - SUBJECTIVE AND OBJECTIVE BOX
ANESTHESIA POSTOP CHECK    28y Female DAY 1 S/P     Vital Signs Last 24 Hrs  T(C): 36.7 (23 Sep 2019 06:07), Max: 36.7 (23 Sep 2019 01:50)  T(F): 98 (23 Sep 2019 06:07), Max: 98 (23 Sep 2019 01:50)  HR: 62 (23 Sep 2019 06:07) (62 - 96)  BP: 121/76 (23 Sep 2019 06:07) (104/67 - 137/61)  BP(mean): 83 (23 Sep 2019 00:40) (77 - 94)  RR: 18 (23 Sep 2019 06:07) (16 - 18)  SpO2: 98% (23 Sep 2019 00:40) (96% - 99%)  I&O's Summary    22 Sep 2019 07:01  -  23 Sep 2019 07:00  --------------------------------------------------------  IN: 1200 mL / OUT: 1900 mL / NET: -700 mL        [x] NO APPARENT ANESTHESIA COMPLICATIONS, no headache, no parasthesias, no difficulty ambulating

## 2019-09-23 NOTE — DISCHARGE NOTE OB - MEDICATION SUMMARY - MEDICATIONS TO TAKE
I will START or STAY ON the medications listed below when I get home from the hospital:    acetaminophen 325 mg oral tablet  -- 3 tab(s) by mouth   -- Indication: For Pain    lanolin topical ointment  -- 1 application on skin every 6 hours, As needed, nipple soreness  -- Indication: For Pain    dibucaine 1% topical ointment  -- 1 application on skin every 6 hours, As needed, Perineal discomfort  -- Indication: For pain    Prenatal Multivitamins with Folic Acid 1 mg oral tablet  -- 1 tab(s) by mouth once a day  -- Indication: For Health

## 2019-09-23 NOTE — DISCHARGE NOTE OB - MATERIALS PROVIDED
[facility] [place] Screening Program/Breastfeeding Log/Breastfeeding Mother’s Support Group Information/Guide to Postpartum Care/[facility] Hearing Screen Program/Back To Sleep Handout/Shaken Baby Prevention Handout/Birth Certificate Instructions

## 2019-09-24 VITALS
DIASTOLIC BLOOD PRESSURE: 66 MMHG | RESPIRATION RATE: 18 BRPM | SYSTOLIC BLOOD PRESSURE: 103 MMHG | HEART RATE: 68 BPM | TEMPERATURE: 98 F

## 2019-09-24 PROCEDURE — 86780 TREPONEMA PALLIDUM: CPT

## 2019-09-24 PROCEDURE — 59025 FETAL NON-STRESS TEST: CPT

## 2019-09-24 PROCEDURE — 85018 HEMOGLOBIN: CPT

## 2019-09-24 PROCEDURE — 85610 PROTHROMBIN TIME: CPT

## 2019-09-24 PROCEDURE — G0463: CPT

## 2019-09-24 PROCEDURE — 86900 BLOOD TYPING SEROLOGIC ABO: CPT

## 2019-09-24 PROCEDURE — 85730 THROMBOPLASTIN TIME PARTIAL: CPT

## 2019-09-24 PROCEDURE — 84550 ASSAY OF BLOOD/URIC ACID: CPT

## 2019-09-24 PROCEDURE — 85014 HEMATOCRIT: CPT

## 2019-09-24 PROCEDURE — 80053 COMPREHEN METABOLIC PANEL: CPT

## 2019-09-24 PROCEDURE — 81003 URINALYSIS AUTO W/O SCOPE: CPT

## 2019-09-24 PROCEDURE — 59050 FETAL MONITOR W/REPORT: CPT

## 2019-09-24 PROCEDURE — 85027 COMPLETE CBC AUTOMATED: CPT

## 2019-09-24 PROCEDURE — 85384 FIBRINOGEN ACTIVITY: CPT

## 2019-09-24 PROCEDURE — 83615 LACTATE (LD) (LDH) ENZYME: CPT

## 2019-09-24 PROCEDURE — 82570 ASSAY OF URINE CREATININE: CPT

## 2019-09-24 PROCEDURE — 84156 ASSAY OF PROTEIN URINE: CPT

## 2019-09-24 PROCEDURE — 86901 BLOOD TYPING SEROLOGIC RH(D): CPT

## 2019-09-24 PROCEDURE — 86850 RBC ANTIBODY SCREEN: CPT

## 2019-09-24 RX ADMIN — Medication 975 MILLIGRAM(S): at 03:35

## 2019-09-24 RX ADMIN — Medication 975 MILLIGRAM(S): at 09:40

## 2019-09-24 RX ADMIN — Medication 975 MILLIGRAM(S): at 04:32

## 2019-09-24 RX ADMIN — Medication 600 MILLIGRAM(S): at 05:19

## 2019-09-24 RX ADMIN — Medication 600 MILLIGRAM(S): at 00:45

## 2019-09-24 RX ADMIN — Medication 600 MILLIGRAM(S): at 06:52

## 2019-09-24 RX ADMIN — Medication 975 MILLIGRAM(S): at 09:05

## 2019-09-24 NOTE — PROGRESS NOTE ADULT - SUBJECTIVE AND OBJECTIVE BOX
OB Attg Note:  PPD 2    S: Patient doing well. Minimal lochia. Pain well controlled.    O: Vital Signs Last 24 Hrs  T(C): 36.4 (24 Sep 2019 05:40), Max: 36.8 (23 Sep 2019 09:00)  T(F): 97.5 (24 Sep 2019 05:40), Max: 98.3 (23 Sep 2019 09:00)  HR: 68 (24 Sep 2019 05:40) (68 - 72)  BP: 103/66 (24 Sep 2019 05:40) (103/66 - 121/80)  BP(mean): --  RR: 18 (24 Sep 2019 05:40) (18 - 18)  SpO2: 98% (23 Sep 2019 17:28) (98% - 98%)    Gen: NAD  Abd: soft, NT, ND, fundus firm below umbilicus  Lochia: moderate  Ext: no tenderness    Labs:                        11.4   x     )-----------( x        ( 23 Sep 2019 09:54 )             34.6       A: 28y PPD#2 s/p  doing well.    Plan:  routine PP care  discharge planning
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
## 2019-11-04 ENCOUNTER — APPOINTMENT (OUTPATIENT)
Dept: ANTEPARTUM | Facility: CLINIC | Age: 29
End: 2019-11-04

## 2020-01-08 PROBLEM — Z00.00 ENCOUNTER FOR PREVENTIVE HEALTH EXAMINATION: Status: ACTIVE | Noted: 2020-01-08

## 2021-04-26 ENCOUNTER — ASOB RESULT (OUTPATIENT)
Age: 31
End: 2021-04-26

## 2021-04-26 ENCOUNTER — APPOINTMENT (OUTPATIENT)
Dept: ANTEPARTUM | Facility: CLINIC | Age: 31
End: 2021-04-26
Payer: COMMERCIAL

## 2021-04-26 PROCEDURE — 99072 ADDL SUPL MATRL&STAF TM PHE: CPT

## 2021-04-26 PROCEDURE — 76801 OB US < 14 WKS SINGLE FETUS: CPT

## 2021-04-26 PROCEDURE — 76813 OB US NUCHAL MEAS 1 GEST: CPT | Mod: 59

## 2021-06-28 ENCOUNTER — APPOINTMENT (OUTPATIENT)
Dept: ANTEPARTUM | Facility: CLINIC | Age: 31
End: 2021-06-28
Payer: COMMERCIAL

## 2021-06-28 ENCOUNTER — ASOB RESULT (OUTPATIENT)
Age: 31
End: 2021-06-28

## 2021-06-28 PROCEDURE — 76817 TRANSVAGINAL US OBSTETRIC: CPT

## 2021-06-28 PROCEDURE — 99072 ADDL SUPL MATRL&STAF TM PHE: CPT

## 2021-06-28 PROCEDURE — 76811 OB US DETAILED SNGL FETUS: CPT

## 2021-08-16 ENCOUNTER — APPOINTMENT (OUTPATIENT)
Dept: ANTEPARTUM | Facility: CLINIC | Age: 31
End: 2021-08-16
Payer: COMMERCIAL

## 2021-08-16 ENCOUNTER — ASOB RESULT (OUTPATIENT)
Age: 31
End: 2021-08-16

## 2021-08-16 PROCEDURE — 76816 OB US FOLLOW-UP PER FETUS: CPT

## 2021-10-01 ENCOUNTER — APPOINTMENT (OUTPATIENT)
Dept: ANTEPARTUM | Facility: CLINIC | Age: 31
End: 2021-10-01
Payer: COMMERCIAL

## 2021-10-01 ENCOUNTER — ASOB RESULT (OUTPATIENT)
Age: 31
End: 2021-10-01

## 2021-10-01 PROCEDURE — 76816 OB US FOLLOW-UP PER FETUS: CPT

## 2021-10-01 PROCEDURE — 76819 FETAL BIOPHYS PROFIL W/O NST: CPT

## 2021-10-04 ENCOUNTER — ASOB RESULT (OUTPATIENT)
Age: 31
End: 2021-10-04

## 2021-10-04 ENCOUNTER — APPOINTMENT (OUTPATIENT)
Dept: ANTEPARTUM | Facility: CLINIC | Age: 31
End: 2021-10-04
Payer: COMMERCIAL

## 2021-10-04 PROCEDURE — 76819 FETAL BIOPHYS PROFIL W/O NST: CPT

## 2021-10-11 ENCOUNTER — APPOINTMENT (OUTPATIENT)
Dept: ANTEPARTUM | Facility: CLINIC | Age: 31
End: 2021-10-11
Payer: COMMERCIAL

## 2021-10-11 ENCOUNTER — ASOB RESULT (OUTPATIENT)
Age: 31
End: 2021-10-11

## 2021-10-11 PROCEDURE — 76819 FETAL BIOPHYS PROFIL W/O NST: CPT

## 2021-10-21 ENCOUNTER — ASOB RESULT (OUTPATIENT)
Age: 31
End: 2021-10-21

## 2021-10-21 ENCOUNTER — APPOINTMENT (OUTPATIENT)
Dept: ANTEPARTUM | Facility: CLINIC | Age: 31
End: 2021-10-21
Payer: COMMERCIAL

## 2021-10-21 PROCEDURE — 76819 FETAL BIOPHYS PROFIL W/O NST: CPT

## 2021-10-29 ENCOUNTER — APPOINTMENT (OUTPATIENT)
Dept: ANTEPARTUM | Facility: CLINIC | Age: 31
End: 2021-10-29
Payer: COMMERCIAL

## 2021-10-29 ENCOUNTER — ASOB RESULT (OUTPATIENT)
Age: 31
End: 2021-10-29

## 2021-10-29 PROCEDURE — 76819 FETAL BIOPHYS PROFIL W/O NST: CPT

## 2021-10-29 PROCEDURE — 76816 OB US FOLLOW-UP PER FETUS: CPT

## 2021-11-01 ENCOUNTER — APPOINTMENT (OUTPATIENT)
Dept: ANTEPARTUM | Facility: CLINIC | Age: 31
End: 2021-11-01
Payer: COMMERCIAL

## 2021-11-01 ENCOUNTER — ASOB RESULT (OUTPATIENT)
Age: 31
End: 2021-11-01

## 2021-11-01 PROCEDURE — 76819 FETAL BIOPHYS PROFIL W/O NST: CPT

## 2021-11-02 ENCOUNTER — TRANSCRIPTION ENCOUNTER (OUTPATIENT)
Age: 31
End: 2021-11-02

## 2021-11-03 ENCOUNTER — ASOB RESULT (OUTPATIENT)
Age: 31
End: 2021-11-03

## 2021-11-03 ENCOUNTER — INPATIENT (INPATIENT)
Facility: HOSPITAL | Age: 31
LOS: 1 days | Discharge: HOME | End: 2021-11-05
Attending: OBSTETRICS & GYNECOLOGY | Admitting: OBSTETRICS & GYNECOLOGY
Payer: COMMERCIAL

## 2021-11-03 ENCOUNTER — APPOINTMENT (OUTPATIENT)
Dept: ANTEPARTUM | Facility: CLINIC | Age: 31
End: 2021-11-03
Payer: COMMERCIAL

## 2021-11-03 VITALS
TEMPERATURE: 99 F | DIASTOLIC BLOOD PRESSURE: 78 MMHG | SYSTOLIC BLOOD PRESSURE: 122 MMHG | RESPIRATION RATE: 18 BRPM | HEART RATE: 93 BPM

## 2021-11-03 DIAGNOSIS — O26.899 OTHER SPECIFIED PREGNANCY RELATED CONDITIONS, UNSPECIFIED TRIMESTER: ICD-10-CM

## 2021-11-03 DIAGNOSIS — Z3A.00 WEEKS OF GESTATION OF PREGNANCY NOT SPECIFIED: ICD-10-CM

## 2021-11-03 DIAGNOSIS — Z34.80 ENCOUNTER FOR SUPERVISION OF OTHER NORMAL PREGNANCY, UNSPECIFIED TRIMESTER: ICD-10-CM

## 2021-11-03 LAB
BASOPHILS # BLD AUTO: 0.03 K/UL — SIGNIFICANT CHANGE UP (ref 0–0.2)
BASOPHILS NFR BLD AUTO: 0.2 % — SIGNIFICANT CHANGE UP (ref 0–2)
BLD GP AB SCN SERPL QL: NEGATIVE — SIGNIFICANT CHANGE UP
COVID-19 SPIKE DOMAIN AB INTERP: POSITIVE
COVID-19 SPIKE DOMAIN ANTIBODY RESULT: 69.3 U/ML — HIGH
EOSINOPHIL # BLD AUTO: 0.1 K/UL — SIGNIFICANT CHANGE UP (ref 0–0.5)
EOSINOPHIL NFR BLD AUTO: 0.7 % — SIGNIFICANT CHANGE UP (ref 0–6)
HCT VFR BLD CALC: 35.9 % — SIGNIFICANT CHANGE UP (ref 34.5–45)
HGB BLD-MCNC: 12.2 G/DL — SIGNIFICANT CHANGE UP (ref 11.5–15.5)
IMM GRANULOCYTES NFR BLD AUTO: 0.5 % — SIGNIFICANT CHANGE UP (ref 0–1.5)
LYMPHOCYTES # BLD AUTO: 1.98 K/UL — SIGNIFICANT CHANGE UP (ref 1–3.3)
LYMPHOCYTES # BLD AUTO: 13.8 % — SIGNIFICANT CHANGE UP (ref 13–44)
MCHC RBC-ENTMCNC: 30.4 PG — SIGNIFICANT CHANGE UP (ref 27–34)
MCHC RBC-ENTMCNC: 34 GM/DL — SIGNIFICANT CHANGE UP (ref 32–36)
MCV RBC AUTO: 89.5 FL — SIGNIFICANT CHANGE UP (ref 80–100)
MONOCYTES # BLD AUTO: 1.08 K/UL — HIGH (ref 0–0.9)
MONOCYTES NFR BLD AUTO: 7.5 % — SIGNIFICANT CHANGE UP (ref 2–14)
NEUTROPHILS # BLD AUTO: 11.06 K/UL — HIGH (ref 1.8–7.4)
NEUTROPHILS NFR BLD AUTO: 77.3 % — HIGH (ref 43–77)
NRBC # BLD: 0 /100 WBCS — SIGNIFICANT CHANGE UP (ref 0–0)
PLATELET # BLD AUTO: 204 K/UL — SIGNIFICANT CHANGE UP (ref 150–400)
RBC # BLD: 4.01 M/UL — SIGNIFICANT CHANGE UP (ref 3.8–5.2)
RBC # FLD: 13.1 % — SIGNIFICANT CHANGE UP (ref 10.3–14.5)
RH IG SCN BLD-IMP: POSITIVE — SIGNIFICANT CHANGE UP
SARS-COV-2 IGG+IGM SERPL QL IA: 69.3 U/ML — HIGH
SARS-COV-2 IGG+IGM SERPL QL IA: POSITIVE
SARS-COV-2 RNA SPEC QL NAA+PROBE: SIGNIFICANT CHANGE UP
T PALLIDUM AB TITR SER: NEGATIVE — SIGNIFICANT CHANGE UP
WBC # BLD: 14.32 K/UL — HIGH (ref 3.8–10.5)
WBC # FLD AUTO: 14.32 K/UL — HIGH (ref 3.8–10.5)

## 2021-11-03 PROCEDURE — 88307 TISSUE EXAM BY PATHOLOGIST: CPT | Mod: 26

## 2021-11-03 PROCEDURE — 76819 FETAL BIOPHYS PROFIL W/O NST: CPT

## 2021-11-03 RX ORDER — SIMETHICONE 80 MG/1
80 TABLET, CHEWABLE ORAL EVERY 4 HOURS
Refills: 0 | Status: DISCONTINUED | OUTPATIENT
Start: 2021-11-04 | End: 2021-11-05

## 2021-11-03 RX ORDER — SODIUM CHLORIDE 9 MG/ML
1000 INJECTION, SOLUTION INTRAVENOUS
Refills: 0 | Status: DISCONTINUED | OUTPATIENT
Start: 2021-11-03 | End: 2021-11-04

## 2021-11-03 RX ORDER — OXYTOCIN 10 UNIT/ML
333.33 VIAL (ML) INJECTION
Qty: 20 | Refills: 0 | Status: DISCONTINUED | OUTPATIENT
Start: 2021-11-04 | End: 2021-11-05

## 2021-11-03 RX ORDER — MORPHINE SULFATE 50 MG/1
2 CAPSULE, EXTENDED RELEASE ORAL ONCE
Refills: 0 | Status: DISCONTINUED | OUTPATIENT
Start: 2021-11-03 | End: 2021-11-04

## 2021-11-03 RX ORDER — OXYTOCIN 10 UNIT/ML
333.33 VIAL (ML) INJECTION
Qty: 20 | Refills: 0 | Status: COMPLETED | OUTPATIENT
Start: 2021-11-03 | End: 2021-11-03

## 2021-11-03 RX ORDER — DIPHENHYDRAMINE HCL 50 MG
25 CAPSULE ORAL EVERY 6 HOURS
Refills: 0 | Status: DISCONTINUED | OUTPATIENT
Start: 2021-11-04 | End: 2021-11-05

## 2021-11-03 RX ORDER — IBUPROFEN 200 MG
600 TABLET ORAL EVERY 6 HOURS
Refills: 0 | Status: DISCONTINUED | OUTPATIENT
Start: 2021-11-04 | End: 2021-11-05

## 2021-11-03 RX ORDER — LANOLIN
1 OINTMENT (GRAM) TOPICAL EVERY 6 HOURS
Refills: 0 | Status: DISCONTINUED | OUTPATIENT
Start: 2021-11-04 | End: 2021-11-05

## 2021-11-03 RX ORDER — CITRIC ACID/SODIUM CITRATE 300-500 MG
15 SOLUTION, ORAL ORAL EVERY 6 HOURS
Refills: 0 | Status: DISCONTINUED | OUTPATIENT
Start: 2021-11-03 | End: 2021-11-04

## 2021-11-03 RX ORDER — DEXAMETHASONE 0.5 MG/5ML
4 ELIXIR ORAL EVERY 6 HOURS
Refills: 0 | Status: DISCONTINUED | OUTPATIENT
Start: 2021-11-03 | End: 2021-11-05

## 2021-11-03 RX ORDER — OXYCODONE HYDROCHLORIDE 5 MG/1
5 TABLET ORAL
Refills: 0 | Status: COMPLETED | OUTPATIENT
Start: 2021-11-04 | End: 2021-11-11

## 2021-11-03 RX ORDER — MAGNESIUM HYDROXIDE 400 MG/1
30 TABLET, CHEWABLE ORAL
Refills: 0 | Status: DISCONTINUED | OUTPATIENT
Start: 2021-11-04 | End: 2021-11-05

## 2021-11-03 RX ORDER — HEPARIN SODIUM 5000 [USP'U]/ML
5000 INJECTION INTRAVENOUS; SUBCUTANEOUS EVERY 12 HOURS
Refills: 0 | Status: DISCONTINUED | OUTPATIENT
Start: 2021-11-04 | End: 2021-11-05

## 2021-11-03 RX ORDER — TETANUS TOXOID, REDUCED DIPHTHERIA TOXOID AND ACELLULAR PERTUSSIS VACCINE, ADSORBED 5; 2.5; 8; 8; 2.5 [IU]/.5ML; [IU]/.5ML; UG/.5ML; UG/.5ML; UG/.5ML
0.5 SUSPENSION INTRAMUSCULAR ONCE
Refills: 0 | Status: DISCONTINUED | OUTPATIENT
Start: 2021-11-04 | End: 2021-11-05

## 2021-11-03 RX ORDER — OXYCODONE HYDROCHLORIDE 5 MG/1
5 TABLET ORAL ONCE
Refills: 0 | Status: DISCONTINUED | OUTPATIENT
Start: 2021-11-04 | End: 2021-11-05

## 2021-11-03 RX ORDER — SODIUM CHLORIDE 9 MG/ML
1000 INJECTION, SOLUTION INTRAVENOUS
Refills: 0 | Status: DISCONTINUED | OUTPATIENT
Start: 2021-11-04 | End: 2021-11-05

## 2021-11-03 RX ORDER — NALBUPHINE HYDROCHLORIDE 10 MG/ML
2.5 INJECTION, SOLUTION INTRAMUSCULAR; INTRAVENOUS; SUBCUTANEOUS EVERY 6 HOURS
Refills: 0 | Status: COMPLETED | OUTPATIENT
Start: 2021-11-03 | End: 2021-11-04

## 2021-11-03 RX ORDER — NALOXONE HYDROCHLORIDE 4 MG/.1ML
0.1 SPRAY NASAL
Refills: 0 | Status: DISCONTINUED | OUTPATIENT
Start: 2021-11-03 | End: 2021-11-05

## 2021-11-03 RX ORDER — ACETAMINOPHEN 500 MG
975 TABLET ORAL
Refills: 0 | Status: DISCONTINUED | OUTPATIENT
Start: 2021-11-04 | End: 2021-11-05

## 2021-11-03 RX ORDER — ACETAMINOPHEN 500 MG
1000 TABLET ORAL ONCE
Refills: 0 | Status: DISCONTINUED | OUTPATIENT
Start: 2021-11-03 | End: 2021-11-03

## 2021-11-03 RX ORDER — KETOROLAC TROMETHAMINE 30 MG/ML
30 SYRINGE (ML) INJECTION EVERY 6 HOURS
Refills: 0 | Status: COMPLETED | OUTPATIENT
Start: 2021-11-04 | End: 2021-11-05

## 2021-11-03 RX ORDER — ONDANSETRON 8 MG/1
4 TABLET, FILM COATED ORAL EVERY 6 HOURS
Refills: 0 | Status: DISCONTINUED | OUTPATIENT
Start: 2021-11-03 | End: 2021-11-05

## 2021-11-03 RX ADMIN — Medication 1000 MILLIUNIT(S)/MIN: at 22:13

## 2021-11-03 RX ADMIN — SODIUM CHLORIDE 125 MILLILITER(S): 9 INJECTION, SOLUTION INTRAVENOUS at 14:04

## 2021-11-03 NOTE — OB RN DELIVERY SUMMARY - NSSELHIDDEN_OBGYN_ALL_OB_FT
[NS_DeliveryAttending1_OBGYN_ALL_OB_FT:[id number]],[NS_DeliveryAssist1_OBGYN_ALL_OB_FT:[id number]],[NS_DeliveryAttending2_OBGYN_ALL_OB_FT:[id number]],[NS_DeliveryRN_OBGYN_ALL_OB_FT:[id number]]

## 2021-11-03 NOTE — OB PROVIDER H&P - ASSESSMENT
A/P: 31 yo P2 @ 39w1d with oligohydramnios  - admit to L&D  - routine labs  - clear diet then NPO when in active labor  - IV hydration  - fetus: reactive NST, vertex, continuous monitoring  - GBS neg  - IOL- PO cytotec  - anesthesia consult prn  - anticipate vaginal delivery    d/w Dr Kaushal Christianson, PAC

## 2021-11-03 NOTE — OB PROVIDER DELIVERY SUMMARY - NSPROVIDERDELIVERYNOTE_OBGYN_ALL_OB_FT
patient taken to OR after 8 minute deceleration into FHR 50-60s. In OR patient noted to have deep variables without recovery to baseline FHR. Decision made to proceed with emergent LTCS.    pLTCS of viable male infant, vertex, apgars 8/9  grossly normal uterus, b/l tubes and ovaries  hysterotomy closed in two layers  surgicel powder placed over hysterotomy and bladder flap  600/2200/175  QBL: 389

## 2021-11-03 NOTE — OB RN PATIENT PROFILE - NSPRENATALGBS_OBGYN_ALL_OB_GET_DAYS
[facility] [address], [address] [place] [address] [address]  Phone: [phone]    DISCHARGE INSTRUCTIONS:    1   _x__ Complete Exam - Normal    2   ___ Exam normal, but entire colon not seen  We will discuss this with you  3   ___ Polyp(s) removed by "burning" - NO pathology report will follow    4   ___ Polyp(s) removed by excision  Pathology report will be available in 4-5 days   Someone from our office will call you with results  5   ___ Exam prompted biopsies  Pathology report will be available in 4-5 days   Someone from our office will call you with results  6   ___ Exam demonstrated findings that need treatment  Prescriptions will be   Given to you  Return to our office in ____ weeks  Please call for appt  7   ___ Original office visit or colonoscopy findings necessitate an office visit  Please call to set up a new appointment    8   ___ Medication  __________________________________________        [address]:    - Go straight home and rest today    - No driving or drinking alcohol for 24 hours    - Resume regular diet and medications unless otherwise instructed  Coumadin and Plavix are blood thinners  You can resume these medications on __________      IF YOU ARE HAVING ANY FEVER, BLEEDING OR PERSISTENT PAIN IN THE ABDOMEN, PLEASE CALL OUR OFFICE ANY DAY OR TIME  [phone]

## 2021-11-03 NOTE — OB RN PATIENT PROFILE - NS_ACCOMPAINEDBY_OBGYN_ALL_OB
Birth control: pill  Period yet:no  Sex:no  Breast feeding  Pap:2018 neg  Recent PHQ 2/9 Score    PHQ 2:  Date Adult PHQ 2 Score Adult PHQ 2 Interpretation   2020 0 No further screening needed       PHQ 9:  Date Adult PHQ 9 Score Adult PHQ 9 Interpretation   2020 1 Minimal Depression     HISTORY OF PRESENT ILLNESS:  Lucinda Ryan is a 30 year old   who presents today for postpartum visit.  She denies any postpartum depression or problems going to the bathroom.  Her child is healthy and doing well without any problems. She is breast feeding without difficulty. She has not had sex yet.  For birth control she is interested in progesterone only pills.  Her daughter, Linda, is healthy and doing well.    PHYSICAL EXAM:  Visit Vitals  /58   Ht 5' 10.98\" (1.803 m)   Wt 70.6 kg   LMP 10/21/2019 Comment: +HPT 19   BMI 21.73 kg/m²    Body mass index is 21.73 kg/m².  General: Alert and oriented x3, in no apparent distress.  Abdomen: Soft, nontender, non-distended with no masses no hepatosplenomegaly, no hernias.  Pelvic exam: External genitalia is normal with no lesions. Perineum is well healed. Vagina is pink and rugated with no lesions. Cervix is normal. Bimanual examination reveals a mobile uterus with normal size, shape and contour and no adnexal masses or tenderness.    Pregnancy Problem List   Problem   Normal Pregnancy (Resolved)     Mateo  Son Kit  MRSA neg  Chicken pox vaccinated  Employer [employer]    Sister's wedding - 20 (in town)          IMPRESSION/PLAN:    1. Normal postpartum examination.  2. Pap Smear is up-to-date  3. Family Planning is a progesterone only pill which was prescribed.  She was given instructions and can start the pill today.  She should take the pill the same time every day.  4. Follow up in one year as needed.   
Spouse

## 2021-11-03 NOTE — OB PROVIDER H&P - NSHPPHYSICALEXAM_GEN_ALL_CORE
ICU Vital Signs Last 24 Hrs  T(C): --  T(F): --  HR: 93 (03 Nov 2021 11:22) (93 - 93)  BP: 122/78 (03 Nov 2021 11:22) (122/78 - 122/78)  BP(mean): --  ABP: --  ABP(mean): --  RR: --  SpO2: --      Gen: NAD  heart: S1S2 RRR  Lungs: CTA b/l  Abd: gravid NTND  LE: no calf tenderness    11/3 ATU sono: vertex, anterior placenta. LISANDRA 1.81  10/29 ATU sono EFW [id number]    Sono: vertex

## 2021-11-03 NOTE — CHART NOTE - NSCHARTNOTEFT_GEN_A_CORE
Note    Called to  the bedside for inaudible FHR.  Pt for IOL for oligo.  FHT Cat 1 prior to current episode.  VE-3/60/-3, AROM, ISE placed   FHR 50's-60's  Intrauterine resuscitation in progress - IVFH, maternal change to LLP and  then to all four's.  FHR increased to 70's.    Anasthesia at the bedside - ephedrine with appropriate rise in BPs to 130's/70's  FHR did not increase after 8 mins from the last time the FHR was heard, so decision made to go to OR to reassess interventions and/or   emergent c/s.  Informed pt and partner of this plan.  In the OR, FHR increased and now at 150's and then again decels noted to the 70's, so decision made to proceed  to emergent c/s.  Partner informed of this decision by this writer.  Regional anasthesia not adequate for c/s so GETA was induced.  Partner updated on this decision.  Pt s/p emergent LTCS of viable male infant.  SPontaneous cry and movement at delivery, Delayed cord clamp.  Baby handed to awaiting pediatricians.  Private OB arrived to assist with completing the c/s.  Please see Delivery note.    MARKOS Hilliard

## 2021-11-03 NOTE — OB RN PATIENT PROFILE - NS_PRENATALHARD_OBGYN_ALL_OB
71yo female, , retired (, non-caregiver domiciled at home with her  with a prior psychiatric diagnosis of ANSON and MDD, 3 previous inpatient psychiatric hospitalizations at [facility] in , 2020, and most recently -2021, prior SA in 2019 by overdosing on pills, no history of NSSIB, no history of violence/aggression, no hx of legal issues, with PMHx of HTN, hyperlipidemia, and nausea, BIB self due to passive SI and worsening depression and anxiety at home.     Patient partially cooperative on exam. Endorses worsening depression and anxiety in the context of fear that her  will divorce her given her mental health issues, which is triggering for her as she has no other family members that live nearby (her parents  about 5yrs ago and she has no children). Further, pt fixated that Effexor is making her nauseous, which is a possible side effect of med but per chart review, pt with  hx of nausea complain. She also describes pattern of worsening anxiety bouts after taking Effexor in the AM. Markedly depressed on interview with report of anhedonia and hopelessness as well as ongoing passive SI. Pt requires inpatient hospitalization for stabilization, med management, and safety.       Plan:  - admit to [facility]   - HOLD Effexor 187.5mg   - C/w with other home meds: mirtazapine 30mg po qHS and Klonopin 1mg in the AM and 0.5mg at 3PM   - c/w home meds for HTN: hydrochlorothiazide 12.5 mg po daily, losartan 100mg po daily, metoprolol tartrate 50mg po BID  - PRNs: Ativan 1mg po q6h for anxiety/agitation; Seroquel 12.5mg po qh6 hrs for anxiety/agitation; Haldol 2mg IM for severe anxiety/agitation If QTc is <500. Available 71yo female, , retired (, non-caregiver domiciled at home with her  with a prior psychiatric diagnosis of ANSON and MDD, 3 previous inpatient psychiatric hospitalizations at [facility] in , 2020, and most recently -2021, prior SA in 2019 by overdosing on pills, no history of NSSIB, no history of violence/aggression, no hx of legal issues, with PMHx of HTN, hyperlipidemia, and nausea, BIB self due to passive SI and worsening depression and anxiety at home.     Patient partially cooperative on exam. Endorses worsening depression and anxiety in the context of fear that her  will divorce her given her mental health issues, which is triggering for her as she has no other family members that live nearby (her parents  about 5yrs ago and she has no children). Further, pt fixated that Effexor is making her nauseous, which is a possible side effect of med but per chart review, pt with  hx of nausea complain. She also describes pattern of worsening anxiety bouts after taking Effexor in the AM. Markedly depressed on interview with report of anhedonia and hopelessness as well as ongoing passive SI. Pt requires inpatient hospitalization for stabilization, med management, and safety.       Plan:  - admit to [facility]   - C/w Effexor 187.5mg po daily to avoid precipitating withdrawal symptoms, primary team to adjust dose as needed   - C/w with other home meds: mirtazapine 30mg po qHS and Klonopin 1mg in the AM and 0.5mg at 3PM, melatonin 5mg po qHS   - c/w home meds for HTN: hydrochlorothiazide 12.5 mg po daily, losartan 100mg po daily, metoprolol tartrate 50mg po BID  - PRNs: Ativan 1mg po q6h for anxiety/agitation; Seroquel 12.5mg po qh6 hrs for anxiety/agitation 73yo female, , retired (, non-caregiver domiciled at home with her  with a prior psychiatric diagnosis of ANSON and MDD, 3 previous inpatient psychiatric hospitalizations at [facility] in , 2020, and most recently -2021, prior SA in 2019 by overdosing on pills, no history of NSSIB, no history of violence/aggression, no hx of legal issues, with PMHx of HTN, hyperlipidemia, and nausea, BIB self due to passive SI and worsening depression and anxiety at home.     Patient Endorses worsening depression and anxiety in the context of fear that her  will divorce her given her mental health issues, which is triggering for her as she has no other family members that live nearby (her parents  about 5yrs ago and she has no children). Further, pt fixated that Effexor is making her nauseous, which is a possible side effect of med but per chart review, pt with  hx of nausea complain. She also describes pattern of worsening anxiety bouts after taking Effexor in the AM. Markedly depressed on interview with report of anhedonia and hopelessness as well as ongoing passive SI. Pt requires inpatient hospitalization for stabilization, med management, and safety.       Plan:  - admit to [facility]   - C/w Effexor 187.5mg po daily to avoid precipitating withdrawal symptoms, primary team to adjust dose as needed   - C/w with other home meds: mirtazapine 30mg po qHS and Klonopin 1mg in the AM and 0.5mg at 3PM, melatonin 5mg po qHS   - c/w home meds for HTN: hydrochlorothiazide 12.5 mg po daily, losartan 100mg po daily, metoprolol tartrate 50mg po BID  - PRNs: Ativan 1mg po q6h for anxiety/agitation; Seroquel 12.5mg po qh6 hrs for anxiety/agitation

## 2021-11-03 NOTE — OB RN INTRAOPERATIVE NOTE - NSSELHIDDEN_OBGYN_ALL_OB_FT
[NS_DeliveryAttending1_OBGYN_ALL_OB_FT:[id number]],[NS_DeliveryAssist1_OBGYN_ALL_OB_FT:[id number]],[NS_DeliveryAttending2_OBGYN_ALL_OB_FT:[id number]],[NS_DeliveryRN_OBGYN_ALL_OB_FT:[id number]]

## 2021-11-03 NOTE — OB PROVIDER H&P - HISTORY OF PRESENT ILLNESS
OB PA Admission Note    31 yo  @ 39w1d by EDC of  presents from office for induction of labor due to oligohydramnios, LISANDRA 1.81  +FM No LOF/VB/CTX    PNC: uncomplicated  GBS neg  EFW 3172 by therese on 10/29    PMH: none  meds: PNV  All: NKA  GYN: denies fibroids/cysts/STI/abn pap  OB: 2017 FT  [id number]  2019 FT  [id number]  PSH: none  Social: denies toxic habits  Psych: denies history

## 2021-11-03 NOTE — OB RN DELIVERY SUMMARY - NS_SEPSISRSKCALC_OBGYN_ALL_OB_FT
EOS calculated successfully. EOS Risk Factor: 0.5/1000 live births ([facility] national incidence); GA=39w1d; Temp=98.78; ROM=0.25; GBS='Negative'; Antibiotics='No antibiotics or any antibiotics < 2 hrs prior to birth'

## 2021-11-03 NOTE — OB PROVIDER DELIVERY SUMMARY - NSSELHIDDEN_OBGYN_ALL_OB_FT
[NS_DeliveryAttending1_OBGYN_ALL_OB_FT:[id number]],[NS_DeliveryAssist1_OBGYN_ALL_OB_FT:[id number]=],[NS_DeliveryAttending2_OBGYN_ALL_OB_FT:[id number]]

## 2021-11-04 ENCOUNTER — TRANSCRIPTION ENCOUNTER (OUTPATIENT)
Age: 31
End: 2021-11-04

## 2021-11-04 LAB
HCT VFR BLD CALC: 30.8 % — LOW (ref 34.5–45)
HGB BLD-MCNC: 10.5 G/DL — LOW (ref 11.5–15.5)
MCHC RBC-ENTMCNC: 31 PG — SIGNIFICANT CHANGE UP (ref 27–34)
MCHC RBC-ENTMCNC: 34.1 GM/DL — SIGNIFICANT CHANGE UP (ref 32–36)
MCV RBC AUTO: 90.9 FL — SIGNIFICANT CHANGE UP (ref 80–100)
NRBC # BLD: 0 /100 WBCS — SIGNIFICANT CHANGE UP (ref 0–0)
PLATELET # BLD AUTO: 168 K/UL — SIGNIFICANT CHANGE UP (ref 150–400)
RBC # BLD: 3.39 M/UL — LOW (ref 3.8–5.2)
RBC # FLD: 13.1 % — SIGNIFICANT CHANGE UP (ref 10.3–14.5)
WBC # BLD: 19.78 K/UL — HIGH (ref 3.8–10.5)
WBC # FLD AUTO: 19.78 K/UL — HIGH (ref 3.8–10.5)

## 2021-11-04 RX ORDER — LANOLIN
1 OINTMENT (GRAM) TOPICAL
Qty: 0 | Refills: 0 | DISCHARGE
Start: 2021-11-04

## 2021-11-04 RX ORDER — IBUPROFEN 200 MG
1 TABLET ORAL
Qty: 0 | Refills: 0 | DISCHARGE
Start: 2021-11-04

## 2021-11-04 RX ORDER — SIMETHICONE 80 MG/1
1 TABLET, CHEWABLE ORAL
Qty: 0 | Refills: 0 | DISCHARGE
Start: 2021-11-04

## 2021-11-04 RX ORDER — OXYCODONE HYDROCHLORIDE 5 MG/1
5 TABLET ORAL
Refills: 0 | Status: DISCONTINUED | OUTPATIENT
Start: 2021-11-04 | End: 2021-11-05

## 2021-11-04 RX ORDER — ACETAMINOPHEN 500 MG
3 TABLET ORAL
Qty: 0 | Refills: 0 | DISCHARGE
Start: 2021-11-04

## 2021-11-04 RX ORDER — NALBUPHINE HYDROCHLORIDE 10 MG/ML
2.5 INJECTION, SOLUTION INTRAMUSCULAR; INTRAVENOUS; SUBCUTANEOUS ONCE
Refills: 0 | Status: DISCONTINUED | OUTPATIENT
Start: 2021-11-04 | End: 2021-11-05

## 2021-11-04 RX ADMIN — Medication 975 MILLIGRAM(S): at 05:55

## 2021-11-04 RX ADMIN — NALBUPHINE HYDROCHLORIDE 2.5 MILLIGRAM(S): 10 INJECTION, SOLUTION INTRAMUSCULAR; INTRAVENOUS; SUBCUTANEOUS at 01:56

## 2021-11-04 RX ADMIN — HEPARIN SODIUM 5000 UNIT(S): 5000 INJECTION INTRAVENOUS; SUBCUTANEOUS at 05:55

## 2021-11-04 RX ADMIN — Medication 30 MILLIGRAM(S): at 22:57

## 2021-11-04 RX ADMIN — Medication 975 MILLIGRAM(S): at 12:22

## 2021-11-04 RX ADMIN — Medication 975 MILLIGRAM(S): at 13:00

## 2021-11-04 RX ADMIN — Medication 30 MILLIGRAM(S): at 04:30

## 2021-11-04 RX ADMIN — Medication 30 MILLIGRAM(S): at 03:57

## 2021-11-04 RX ADMIN — NALBUPHINE HYDROCHLORIDE 2.5 MILLIGRAM(S): 10 INJECTION, SOLUTION INTRAMUSCULAR; INTRAVENOUS; SUBCUTANEOUS at 02:30

## 2021-11-04 RX ADMIN — Medication 30 MILLIGRAM(S): at 09:48

## 2021-11-04 RX ADMIN — Medication 975 MILLIGRAM(S): at 06:25

## 2021-11-04 RX ADMIN — HEPARIN SODIUM 5000 UNIT(S): 5000 INJECTION INTRAVENOUS; SUBCUTANEOUS at 18:30

## 2021-11-04 RX ADMIN — Medication 975 MILLIGRAM(S): at 19:05

## 2021-11-04 RX ADMIN — Medication 30 MILLIGRAM(S): at 15:41

## 2021-11-04 RX ADMIN — Medication 30 MILLIGRAM(S): at 23:30

## 2021-11-04 RX ADMIN — Medication 30 MILLIGRAM(S): at 16:00

## 2021-11-04 RX ADMIN — NALBUPHINE HYDROCHLORIDE 2.5 MILLIGRAM(S): 10 INJECTION, SOLUTION INTRAMUSCULAR; INTRAVENOUS; SUBCUTANEOUS at 15:17

## 2021-11-04 RX ADMIN — Medication 975 MILLIGRAM(S): at 18:31

## 2021-11-04 RX ADMIN — Medication 30 MILLIGRAM(S): at 10:10

## 2021-11-04 NOTE — PROGRESS NOTE ADULT - ASSESSMENT
A/P: 29yo IOL for olighydramnios POD#1 s/p pLTCS for NRFHT.  Patient is stable and doing well post-operatively.     - Continue regular diet.  - Increase ambulation, SCDs when not ambulating, Heparin for DVT ppx  - Continue motrin, tylenol, oxycodone PRN for pain control  - F/u AM CBC    ELI Bynum PGY1 A/P: 29yo IOL for olighydramnios POD#1 s/p pLTCS for NRFHT.  Patient is stable and doing well post-operatively.     - DTV 8 hours post Alvarez removal  - Continue regular diet.  - Increase ambulation, SCDs when not ambulating, Heparin for DVT ppx  - Continue motrin, tylenol, oxycodone PRN for pain control  - Appropriate Hct drop from 35.9 -> 30.8 for     M. Misa PGY1

## 2021-11-04 NOTE — OB NEONATOLOGY/PEDIATRICIAN DELIVERY SUMMARY - NSPEDSNEONOTESA_OBGYN_ALL_OB_FT
Baby boy born at 39.1 wks via crash CS 2/2 NRFHR to a 29y/o  B+ blood type mother. No significant maternal or prenatal history. PNL nr/immune/-, GBS - 10/15. AROM at 20:03 with clear fluids. Baby emerged vigorous, crying, was w/d/s/s with APGARS of 8/9. Baby appeared to be retracting at 6 minute of life and sating 78-80%. Was placed on CPAP (highest setting /60) until 21 minutes of life and which time trialed on RA with sats above 90% Mom would like to breast feed, consents Hep B and consents circ. EOS 0.08    BW:  :   TOB: 20:18  ADOD:

## 2021-11-04 NOTE — DISCHARGE NOTE OB - MEDICATION SUMMARY - MEDICATIONS TO TAKE
I will START or STAY ON the medications listed below when I get home from the hospital:    ibuprofen 600 mg oral tablet  -- 1 tab(s) by mouth every 6 hours  -- Indication: For pain    acetaminophen 325 mg oral tablet  -- 3 tab(s) by mouth   -- Indication: For pain    lanolin topical ointment  -- 1 application on skin every 6 hours, As needed, Sore Nipples  -- Indication: For Nipple soreness    Prenatal Multivitamins with Folic Acid 1 mg oral tablet  -- 1 tab(s) by mouth once a day  -- Indication: For postpartum    simethicone 80 mg oral tablet, chewable  -- 1 tab(s) by mouth every 4 hours, As needed, Gas  -- Indication: For gas pain

## 2021-11-04 NOTE — DISCHARGE NOTE OB - CARE PLAN
Principal Discharge DX:	 delivery delivered  Assessment and plan of treatment:	diet and activities as discussed and tolerated.   Please call office to schedule postpartum appointment 5 weeks after delivery or earlier if needed   1

## 2021-11-04 NOTE — DISCHARGE NOTE OB - MATERIALS PROVIDED
Vaccinations/[facility]  Screening Program/  Immunization Record/Breastfeeding Log/Breastfeeding Mother’s Support Group Information/Guide to Postpartum Care/[facility] Hearing Screen Program/Back To Sleep Handout/Shaken Baby Prevention Handout/Breastfeeding Guide and Packet/Birth Certificate Instructions/Discharge Medication Information for Patients and Families Pocket Guide

## 2021-11-04 NOTE — DISCHARGE NOTE OB - PLAN OF CARE
diet and activities as discussed and tolerated.   Please call office to schedule postpartum appointment 5 weeks after delivery or earlier if needed

## 2021-11-04 NOTE — DISCHARGE NOTE OB - CARE PROVIDER_API CALL
Denice Easley)  Obstetrics and Gynecology  [address] [place] [address]  Phone: [phone]  Fax: [phone]  Follow Up Time:

## 2021-11-04 NOTE — DISCHARGE NOTE OB - PATIENT PORTAL LINK FT
You can access the FollowMyHealth Patient Portal offered by [facility] by registering at the following website: [email or web address]. By joining [employer]’s FollowMyHealth portal, you will also be able to view your health information using other applications (apps) compatible with our system.

## 2021-11-04 NOTE — PROGRESS NOTE ADULT - SUBJECTIVE AND OBJECTIVE BOX
OB Progress Note:  Delivery, POD#1    S: 31yo POD#1 s/p LTCS . Her pain is well controlled. She is tolerating a regular diet and passing flatus. Denies N/V. Denies CP/SOB/lightheadedness/dizziness. She is ambulating without difficulty. Voiding spontaneously.     O:   Vital Signs Last 24 Hrs  T(C): 36.5 (2021 05:06), Max: 37.1 (2021 17:50)  T(F): 97.7 (2021 05:06), Max: 98.8 (2021 17:50)  HR: 65 (2021 05:06) (41 - 108)  BP: 99/66 (2021 05:06) (86/49 - 152/-)  BP(mean): 84 (2021 23:55) (66 - 84)  RR: 18 (2021 05:06) (13 - 20)  SpO2: 96% (2021 05:06) (85% - 99%)    Labs:  Blood type: B Positive  Rubella IgG: RPR: Negative                          12.2   14.32<H> >-----------< 204    ( 11-03 @ 13:37 )             35.9                  acetaminophen     Tablet .. 975 milliGRAM(s) Oral <User Schedule>  dexAMETHasone  Injectable 4 milliGRAM(s) IV Push every 6 hours PRN  diphenhydrAMINE 25 milliGRAM(s) Oral every 6 hours PRN  diphtheria/tetanus/pertussis (acellular) Vaccine (ADAcel) 0.5 milliLiter(s) IntraMuscular once  heparin   Injectable 5000 Unit(s) SubCutaneous every 12 hours  ibuprofen  Tablet. 600 milliGRAM(s) Oral every 6 hours  ketorolac   Injectable 30 milliGRAM(s) IV Push every 6 hours  lactated ringers. 1000 milliLiter(s) IV Continuous <Continuous>  lanolin Ointment 1 Application(s) Topical every 6 hours PRN  magnesium hydroxide Suspension 30 milliLiter(s) Oral two times a day PRN  morphine PF Epidural 2 milliGRAM(s) Epidural once  nalbuphine Injectable 2.5 milliGRAM(s) IV Push every 6 hours PRN  naloxone Injectable 0.1 milliGRAM(s) IV Push every 3 minutes PRN  ondansetron Injectable 4 milliGRAM(s) IV Push every 6 hours PRN  oxyCODONE    IR 5 milliGRAM(s) Oral every 3 hours PRN  oxyCODONE    IR 5 milliGRAM(s) Oral once PRN  oxytocin Infusion 333.333 milliUNIT(s)/Min IV Continuous <Continuous>  simethicone 80 milliGRAM(s) Chew every 4 hours PRN      PE:  General: NAD  CV: RRR  Pulm: CTAB  Abdomen: Mildly distended, appropriately tender, incision c/d/i. Fundus firm @umbilicus  Extremities: No calf tenderness, no pitting edema     OB Progress Note:  Delivery, POD#1    S: 29yo POD#1 s/p LTCS . Her pain is well controlled. She is tolerating a regular diet and passing flatus. Denies N/V. Denies CP/SOB/lightheadedness/dizziness. She is ambulating without difficulty. Alvarez out this AM, patient has not yet voided spontaneously yet.    O:   Vital Signs Last 24 Hrs  T(C): 36.5 (2021 05:06), Max: 37.1 (2021 17:50)  T(F): 97.7 (2021 05:06), Max: 98.8 (2021 17:50)  HR: 65 (2021 05:06) (41 - 108)  BP: 99/66 (2021 05:06) (86/49 - 152/-)  BP(mean): 84 (2021 23:55) (66 - 84)  RR: 18 (2021 05:06) (13 - 20)  SpO2: 96% (2021 05:06) (85% - 99%)    Labs:  Blood type: B Positive  Rubella IgG: RPR: Negative                          12.2   14.32<H> >-----------< 204    ( 11-03 @ 13:37 )             35.9                  acetaminophen     Tablet .. 975 milliGRAM(s) Oral <User Schedule>  dexAMETHasone  Injectable 4 milliGRAM(s) IV Push every 6 hours PRN  diphenhydrAMINE 25 milliGRAM(s) Oral every 6 hours PRN  diphtheria/tetanus/pertussis (acellular) Vaccine (ADAcel) 0.5 milliLiter(s) IntraMuscular once  heparin   Injectable 5000 Unit(s) SubCutaneous every 12 hours  ibuprofen  Tablet. 600 milliGRAM(s) Oral every 6 hours  ketorolac   Injectable 30 milliGRAM(s) IV Push every 6 hours  lactated ringers. 1000 milliLiter(s) IV Continuous <Continuous>  lanolin Ointment 1 Application(s) Topical every 6 hours PRN  magnesium hydroxide Suspension 30 milliLiter(s) Oral two times a day PRN  morphine PF Epidural 2 milliGRAM(s) Epidural once  nalbuphine Injectable 2.5 milliGRAM(s) IV Push every 6 hours PRN  naloxone Injectable 0.1 milliGRAM(s) IV Push every 3 minutes PRN  ondansetron Injectable 4 milliGRAM(s) IV Push every 6 hours PRN  oxyCODONE    IR 5 milliGRAM(s) Oral every 3 hours PRN  oxyCODONE    IR 5 milliGRAM(s) Oral once PRN  oxytocin Infusion 333.333 milliUNIT(s)/Min IV Continuous <Continuous>  simethicone 80 milliGRAM(s) Chew every 4 hours PRN      PE:  General: NAD  CV: RRR  Pulm: CTAB  Abdomen: Mildly distended, appropriately tender, incision c/d/i. Fundus firm @umbilicus  Extremities: No calf tenderness, no pitting edema

## 2021-11-05 VITALS
DIASTOLIC BLOOD PRESSURE: 82 MMHG | SYSTOLIC BLOOD PRESSURE: 121 MMHG | OXYGEN SATURATION: 98 % | TEMPERATURE: 98 F | WEIGHT: 244.93 LBS | RESPIRATION RATE: 18 BRPM | HEART RATE: 90 BPM | HEIGHT: 71 IN

## 2021-11-05 PROCEDURE — 59050 FETAL MONITOR W/REPORT: CPT

## 2021-11-05 PROCEDURE — U0005: CPT

## 2021-11-05 PROCEDURE — 36415 COLL VENOUS BLD VENIPUNCTURE: CPT

## 2021-11-05 PROCEDURE — 85027 COMPLETE CBC AUTOMATED: CPT

## 2021-11-05 PROCEDURE — 86769 SARS-COV-2 COVID-19 ANTIBODY: CPT

## 2021-11-05 PROCEDURE — U0003: CPT

## 2021-11-05 PROCEDURE — G0463: CPT

## 2021-11-05 PROCEDURE — 85025 COMPLETE CBC W/AUTO DIFF WBC: CPT

## 2021-11-05 PROCEDURE — 86901 BLOOD TYPING SEROLOGIC RH(D): CPT

## 2021-11-05 PROCEDURE — 86900 BLOOD TYPING SEROLOGIC ABO: CPT

## 2021-11-05 PROCEDURE — 88307 TISSUE EXAM BY PATHOLOGIST: CPT

## 2021-11-05 PROCEDURE — 86850 RBC ANTIBODY SCREEN: CPT

## 2021-11-05 PROCEDURE — 59025 FETAL NON-STRESS TEST: CPT

## 2021-11-05 PROCEDURE — 86780 TREPONEMA PALLIDUM: CPT

## 2021-11-05 RX ADMIN — HEPARIN SODIUM 5000 UNIT(S): 5000 INJECTION INTRAVENOUS; SUBCUTANEOUS at 06:51

## 2021-11-05 RX ADMIN — Medication 30 MILLIGRAM(S): at 13:15

## 2021-11-05 RX ADMIN — Medication 975 MILLIGRAM(S): at 09:15

## 2021-11-05 RX ADMIN — Medication 975 MILLIGRAM(S): at 02:30

## 2021-11-05 RX ADMIN — Medication 30 MILLIGRAM(S): at 12:44

## 2021-11-05 RX ADMIN — Medication 975 MILLIGRAM(S): at 08:38

## 2021-11-05 RX ADMIN — Medication 975 MILLIGRAM(S): at 01:53

## 2021-11-05 RX ADMIN — Medication 30 MILLIGRAM(S): at 05:31

## 2021-11-05 NOTE — PROGRESS NOTE ADULT - ASSESSMENT
A/P: 30y POD#2 s/p LTCS c/b requiring GETA in the context of NRFHT. Patient is stable and doing well post-operatively.    - Continue regular diet.  - Increase ambulation.  - Continue motrin, tylenol, oxycodone PRN for pain control    Chip Bullard, PGY-1  Obstetrics and Gynecology

## 2021-11-05 NOTE — PROGRESS NOTE ADULT - SUBJECTIVE AND OBJECTIVE BOX
OB Progress Note: LTCS, POD#2    S: 30y POD#2 s/p LTCS c/b requiring GETA in the context of NRFHT. Pain is well controlled. She is tolerating a regular diet and passing flatus. She is voiding spontaneously, and ambulating without difficulty. Denies CP/SOB. Denies lightheadedness/dizziness. Denies N/V.    O:  Vitals:  Vital Signs Last 24 Hrs  T(C): 36.8 (05 Nov 2021 01:59), Max: 37 (04 Nov 2021 17:59)  T(F): 98.2 (05 Nov 2021 01:59), Max: 98.6 (04 Nov 2021 17:59)  HR: 81 (05 Nov 2021 01:59) (67 - 81)  BP: 115/74 (05 Nov 2021 01:59) (104/68 - 119/77)  BP(mean): --  RR: 18 (05 Nov 2021 01:59) (18 - 18)  SpO2: 96% (05 Nov 2021 01:59) (96% - 99%)    MEDICATIONS  (STANDING):  acetaminophen     Tablet .. 975 milliGRAM(s) Oral <User Schedule>  diphtheria/tetanus/pertussis (acellular) Vaccine (ADAcel) 0.5 milliLiter(s) IntraMuscular once  heparin   Injectable 5000 Unit(s) SubCutaneous every 12 hours  ibuprofen  Tablet. 600 milliGRAM(s) Oral every 6 hours  ketorolac   Injectable 30 milliGRAM(s) IV Push every 6 hours  lactated ringers. 1000 milliLiter(s) (125 mL/Hr) IV Continuous <Continuous>  nalbuphine Injectable 2.5 milliGRAM(s) IV Push once  oxytocin Infusion 333.333 milliUNIT(s)/Min (1000 mL/Hr) IV Continuous <Continuous>      MEDICATIONS  (PRN):  dexAMETHasone  Injectable 4 milliGRAM(s) IV Push every 6 hours PRN Nausea  diphenhydrAMINE 25 milliGRAM(s) Oral every 6 hours PRN Pruritus  lanolin Ointment 1 Application(s) Topical every 6 hours PRN Sore Nipples  magnesium hydroxide Suspension 30 milliLiter(s) Oral two times a day PRN Constipation  naloxone Injectable 0.1 milliGRAM(s) IV Push every 3 minutes PRN For ANY of the following changes in patient status:  A. Breaths Per Minute LESS THAN 10, B. Oxygen saturation LESS THAN 90%, C. Sedation score of 6 for Stop After: 4 Times  ondansetron Injectable 4 milliGRAM(s) IV Push every 6 hours PRN Nausea  oxyCODONE    IR 5 milliGRAM(s) Oral every 3 hours PRN Moderate to Severe Pain (4-10)  oxyCODONE    IR 5 milliGRAM(s) Oral once PRN Moderate to Severe Pain (4-10)  simethicone 80 milliGRAM(s) Chew every 4 hours PRN Gas      Labs:  Blood type: B Positive  Rubella IgG: RPR: Negative                          10.5<L>   19.78<H> >-----------< 168    ( 11-04 @ 06:41 )             30.8<L>                        12.2   14.32<H> >-----------< 204    ( 11-03 @ 13:37 )             35.9          PE:  General: NAD  Abdomen: Incision c/d/i. Appropriately tender. Soft abdomen   Extremities: No calf tenderness bilaterally.

## 2022-12-13 NOTE — DISCHARGE NOTE OB - FUNCTIONAL SCREEN CURRENT LEVEL: DRESSING, MLM
Detail Level: Detailed Quality 130: Documentation Of Current Medications In The Medical Record: Current Medications Documented Quality 431: Preventive Care And Screening: Unhealthy Alcohol Use - Screening: Patient not identified as an unhealthy alcohol user when screened for unhealthy alcohol use using a systematic screening method Quality 402: Tobacco Use And Help With Quitting Among Adolescents: Patient screened for tobacco and never smoked Quality 110: Preventive Care And Screening: Influenza Immunization: Influenza Immunization Administered during Influenza season (0) independent

## 2024-02-10 ENCOUNTER — WALK IN (OUTPATIENT)
Dept: URGENT CARE | Age: 34
End: 2024-02-10

## 2024-02-10 VITALS
SYSTOLIC BLOOD PRESSURE: 117 MMHG | HEART RATE: 68 BPM | TEMPERATURE: 97.3 F | BODY MASS INDEX: 31.78 KG/M2 | HEIGHT: 70 IN | DIASTOLIC BLOOD PRESSURE: 80 MMHG | RESPIRATION RATE: 16 BRPM | WEIGHT: 222 LBS

## 2024-02-10 DIAGNOSIS — J02.9 SORE THROAT: ICD-10-CM

## 2024-02-10 DIAGNOSIS — J01.00 ACUTE NON-RECURRENT MAXILLARY SINUSITIS: Primary | ICD-10-CM

## 2024-02-10 LAB
INTERNAL PROCEDURAL CONTROLS ACCEPTABLE: YES
S PYO AG THROAT QL IA.RAPID: NEGATIVE
TEST LOT EXPIRATION DATE: NORMAL
TEST LOT NUMBER: NORMAL

## 2024-02-10 RX ORDER — VENLAFAXINE 75 MG/1
TABLET ORAL
COMMUNITY
Start: 2023-12-21

## 2024-02-10 RX ORDER — AMOXICILLIN AND CLAVULANATE POTASSIUM 875; 125 MG/1; MG/1
1 TABLET, FILM COATED ORAL 2 TIMES DAILY
Qty: 20 TABLET | Refills: 0 | Status: SHIPPED | OUTPATIENT
Start: 2024-02-10 | End: 2024-02-20

## 2024-02-10 RX ORDER — LAMOTRIGINE 25 MG/1
25 TABLET ORAL AT BEDTIME
COMMUNITY
Start: 2024-02-06

## 2024-02-10 ASSESSMENT — ENCOUNTER SYMPTOMS
SINUS PAIN: 1
COUGH: 1

## 2024-02-10 ASSESSMENT — PAIN SCALES - GENERAL: PAINLEVEL: 5

## 2024-02-12 LAB — S PYO SPEC QL CULT: NORMAL
